# Patient Record
Sex: FEMALE | Race: BLACK OR AFRICAN AMERICAN | NOT HISPANIC OR LATINO | URBAN - METROPOLITAN AREA
[De-identification: names, ages, dates, MRNs, and addresses within clinical notes are randomized per-mention and may not be internally consistent; named-entity substitution may affect disease eponyms.]

---

## 2019-07-19 ENCOUNTER — INPATIENT (INPATIENT)
Facility: HOSPITAL | Age: 64
LOS: 3 days | Discharge: HOME | End: 2019-07-23
Attending: HOSPITALIST | Admitting: HOSPITALIST
Payer: COMMERCIAL

## 2019-07-19 VITALS
TEMPERATURE: 98 F | DIASTOLIC BLOOD PRESSURE: 74 MMHG | OXYGEN SATURATION: 100 % | SYSTOLIC BLOOD PRESSURE: 127 MMHG | HEART RATE: 60 BPM | RESPIRATION RATE: 18 BRPM

## 2019-07-19 DIAGNOSIS — R51 HEADACHE: ICD-10-CM

## 2019-07-19 LAB
ALBUMIN SERPL ELPH-MCNC: 4 G/DL — SIGNIFICANT CHANGE UP (ref 3.3–5)
ALP SERPL-CCNC: 56 U/L — SIGNIFICANT CHANGE UP (ref 40–120)
ALT FLD-CCNC: 28 U/L — SIGNIFICANT CHANGE UP (ref 4–33)
ANION GAP SERPL CALC-SCNC: 14 MMO/L — SIGNIFICANT CHANGE UP (ref 7–14)
APPEARANCE UR: CLEAR — SIGNIFICANT CHANGE UP
AST SERPL-CCNC: 21 U/L — SIGNIFICANT CHANGE UP (ref 4–32)
BASE EXCESS BLDV CALC-SCNC: -3.2 MMOL/L — SIGNIFICANT CHANGE UP
BASE EXCESS BLDV CALC-SCNC: 0.5 MMOL/L — SIGNIFICANT CHANGE UP
BASOPHILS # BLD AUTO: 0.04 K/UL — SIGNIFICANT CHANGE UP (ref 0–0.2)
BASOPHILS NFR BLD AUTO: 0.8 % — SIGNIFICANT CHANGE UP (ref 0–2)
BILIRUB SERPL-MCNC: 0.3 MG/DL — SIGNIFICANT CHANGE UP (ref 0.2–1.2)
BILIRUB UR-MCNC: NEGATIVE — SIGNIFICANT CHANGE UP
BLOOD GAS VENOUS - CREATININE: 0.72 MG/DL — SIGNIFICANT CHANGE UP (ref 0.5–1.3)
BLOOD GAS VENOUS - CREATININE: 0.83 MG/DL — SIGNIFICANT CHANGE UP (ref 0.5–1.3)
BLOOD UR QL VISUAL: NEGATIVE — SIGNIFICANT CHANGE UP
BUN SERPL-MCNC: 11 MG/DL — SIGNIFICANT CHANGE UP (ref 7–23)
CALCIUM SERPL-MCNC: 9.1 MG/DL — SIGNIFICANT CHANGE UP (ref 8.4–10.5)
CHLORIDE BLDV-SCNC: 109 MMOL/L — HIGH (ref 96–108)
CHLORIDE BLDV-SCNC: 112 MMOL/L — HIGH (ref 96–108)
CHLORIDE SERPL-SCNC: 106 MMOL/L — SIGNIFICANT CHANGE UP (ref 98–107)
CO2 SERPL-SCNC: 19 MMOL/L — LOW (ref 22–31)
COLOR SPEC: SIGNIFICANT CHANGE UP
CREAT SERPL-MCNC: 0.78 MG/DL — SIGNIFICANT CHANGE UP (ref 0.5–1.3)
EOSINOPHIL # BLD AUTO: 0 K/UL — SIGNIFICANT CHANGE UP (ref 0–0.5)
EOSINOPHIL NFR BLD AUTO: 0 % — SIGNIFICANT CHANGE UP (ref 0–6)
GAS PNL BLDV: 132 MMOL/L — LOW (ref 136–146)
GAS PNL BLDV: 139 MMOL/L — SIGNIFICANT CHANGE UP (ref 136–146)
GLUCOSE BLDV-MCNC: 151 MG/DL — HIGH (ref 70–99)
GLUCOSE BLDV-MCNC: 167 MG/DL — HIGH (ref 70–99)
GLUCOSE SERPL-MCNC: 156 MG/DL — HIGH (ref 70–99)
GLUCOSE UR-MCNC: 30 — SIGNIFICANT CHANGE UP
HCO3 BLDV-SCNC: 21 MMOL/L — SIGNIFICANT CHANGE UP (ref 20–27)
HCO3 BLDV-SCNC: 24 MMOL/L — SIGNIFICANT CHANGE UP (ref 20–27)
HCT VFR BLD CALC: 38.5 % — SIGNIFICANT CHANGE UP (ref 34.5–45)
HCT VFR BLDV CALC: 39.7 % — SIGNIFICANT CHANGE UP (ref 34.5–45)
HCT VFR BLDV CALC: 39.8 % — SIGNIFICANT CHANGE UP (ref 34.5–45)
HGB BLD-MCNC: 12.4 G/DL — SIGNIFICANT CHANGE UP (ref 11.5–15.5)
HGB BLDV-MCNC: 12.9 G/DL — SIGNIFICANT CHANGE UP (ref 11.5–15.5)
HGB BLDV-MCNC: 12.9 G/DL — SIGNIFICANT CHANGE UP (ref 11.5–15.5)
IMM GRANULOCYTES NFR BLD AUTO: 0.4 % — SIGNIFICANT CHANGE UP (ref 0–1.5)
KETONES UR-MCNC: SIGNIFICANT CHANGE UP
LACTATE BLDV-MCNC: 2.8 MMOL/L — HIGH (ref 0.5–2)
LACTATE BLDV-MCNC: 3.9 MMOL/L — HIGH (ref 0.5–2)
LEUKOCYTE ESTERASE UR-ACNC: NEGATIVE — SIGNIFICANT CHANGE UP
LYMPHOCYTES # BLD AUTO: 0.86 K/UL — LOW (ref 1–3.3)
LYMPHOCYTES # BLD AUTO: 18.3 % — SIGNIFICANT CHANGE UP (ref 13–44)
MCHC RBC-ENTMCNC: 30.1 PG — SIGNIFICANT CHANGE UP (ref 27–34)
MCHC RBC-ENTMCNC: 32.2 % — SIGNIFICANT CHANGE UP (ref 32–36)
MCV RBC AUTO: 93.4 FL — SIGNIFICANT CHANGE UP (ref 80–100)
MONOCYTES # BLD AUTO: 0.34 K/UL — SIGNIFICANT CHANGE UP (ref 0–0.9)
MONOCYTES NFR BLD AUTO: 7.2 % — SIGNIFICANT CHANGE UP (ref 2–14)
NEUTROPHILS # BLD AUTO: 3.45 K/UL — SIGNIFICANT CHANGE UP (ref 1.8–7.4)
NEUTROPHILS NFR BLD AUTO: 73.3 % — SIGNIFICANT CHANGE UP (ref 43–77)
NITRITE UR-MCNC: NEGATIVE — SIGNIFICANT CHANGE UP
NRBC # FLD: 0 K/UL — SIGNIFICANT CHANGE UP (ref 0–0)
PCO2 BLDV: 37 MMHG — LOW (ref 41–51)
PCO2 BLDV: 44 MMHG — SIGNIFICANT CHANGE UP (ref 41–51)
PH BLDV: 7.32 PH — SIGNIFICANT CHANGE UP (ref 7.32–7.43)
PH BLDV: 7.43 PH — SIGNIFICANT CHANGE UP (ref 7.32–7.43)
PH UR: 7.5 — SIGNIFICANT CHANGE UP (ref 5–8)
PLATELET # BLD AUTO: 235 K/UL — SIGNIFICANT CHANGE UP (ref 150–400)
PMV BLD: 9.6 FL — SIGNIFICANT CHANGE UP (ref 7–13)
PO2 BLDV: 35 MMHG — SIGNIFICANT CHANGE UP (ref 35–40)
PO2 BLDV: 50 MMHG — HIGH (ref 35–40)
POTASSIUM BLDV-SCNC: 3.5 MMOL/L — SIGNIFICANT CHANGE UP (ref 3.4–4.5)
POTASSIUM BLDV-SCNC: 5.2 MMOL/L — HIGH (ref 3.4–4.5)
POTASSIUM SERPL-MCNC: 3.8 MMOL/L — SIGNIFICANT CHANGE UP (ref 3.5–5.3)
POTASSIUM SERPL-SCNC: 3.8 MMOL/L — SIGNIFICANT CHANGE UP (ref 3.5–5.3)
PROT SERPL-MCNC: 7.2 G/DL — SIGNIFICANT CHANGE UP (ref 6–8.3)
PROT UR-MCNC: 10 — SIGNIFICANT CHANGE UP
RBC # BLD: 4.12 M/UL — SIGNIFICANT CHANGE UP (ref 3.8–5.2)
RBC # FLD: 12.5 % — SIGNIFICANT CHANGE UP (ref 10.3–14.5)
SAO2 % BLDV: 67 % — SIGNIFICANT CHANGE UP (ref 60–85)
SAO2 % BLDV: 79.9 % — SIGNIFICANT CHANGE UP (ref 60–85)
SODIUM SERPL-SCNC: 139 MMOL/L — SIGNIFICANT CHANGE UP (ref 135–145)
SP GR SPEC: 1.03 — SIGNIFICANT CHANGE UP (ref 1–1.04)
TROPONIN T, HIGH SENSITIVITY: < 6 NG/L — SIGNIFICANT CHANGE UP (ref ?–14)
UROBILINOGEN FLD QL: NORMAL — SIGNIFICANT CHANGE UP
WBC # BLD: 4.71 K/UL — SIGNIFICANT CHANGE UP (ref 3.8–10.5)
WBC # FLD AUTO: 4.71 K/UL — SIGNIFICANT CHANGE UP (ref 3.8–10.5)

## 2019-07-19 PROCEDURE — 70496 CT ANGIOGRAPHY HEAD: CPT | Mod: 26

## 2019-07-19 RX ORDER — ONDANSETRON 8 MG/1
4 TABLET, FILM COATED ORAL ONCE
Refills: 0 | Status: COMPLETED | OUTPATIENT
Start: 2019-07-19 | End: 2019-07-19

## 2019-07-19 RX ORDER — METOCLOPRAMIDE HCL 10 MG
10 TABLET ORAL ONCE
Refills: 0 | Status: COMPLETED | OUTPATIENT
Start: 2019-07-19 | End: 2019-07-19

## 2019-07-19 RX ORDER — ASPIRIN/CALCIUM CARB/MAGNESIUM 324 MG
81 TABLET ORAL ONCE
Refills: 0 | Status: COMPLETED | OUTPATIENT
Start: 2019-07-19 | End: 2019-07-19

## 2019-07-19 RX ORDER — SODIUM CHLORIDE 9 MG/ML
1000 INJECTION INTRAMUSCULAR; INTRAVENOUS; SUBCUTANEOUS ONCE
Refills: 0 | Status: COMPLETED | OUTPATIENT
Start: 2019-07-19 | End: 2019-07-19

## 2019-07-19 RX ORDER — MECLIZINE HCL 12.5 MG
25 TABLET ORAL ONCE
Refills: 0 | Status: COMPLETED | OUTPATIENT
Start: 2019-07-19 | End: 2019-07-19

## 2019-07-19 RX ORDER — ONDANSETRON 8 MG/1
4 TABLET, FILM COATED ORAL ONCE
Refills: 0 | Status: DISCONTINUED | OUTPATIENT
Start: 2019-07-19 | End: 2019-07-19

## 2019-07-19 RX ORDER — CLONAZEPAM 1 MG
0.5 TABLET ORAL ONCE
Refills: 0 | Status: DISCONTINUED | OUTPATIENT
Start: 2019-07-19 | End: 2019-07-19

## 2019-07-19 RX ADMIN — SODIUM CHLORIDE 1000 MILLILITER(S): 9 INJECTION INTRAMUSCULAR; INTRAVENOUS; SUBCUTANEOUS at 18:19

## 2019-07-19 RX ADMIN — Medication 0.5 MILLIGRAM(S): at 22:14

## 2019-07-19 RX ADMIN — ONDANSETRON 4 MILLIGRAM(S): 8 TABLET, FILM COATED ORAL at 21:13

## 2019-07-19 RX ADMIN — SODIUM CHLORIDE 1000 MILLILITER(S): 9 INJECTION INTRAMUSCULAR; INTRAVENOUS; SUBCUTANEOUS at 22:07

## 2019-07-19 RX ADMIN — Medication 81 MILLIGRAM(S): at 22:47

## 2019-07-19 RX ADMIN — Medication 25 MILLIGRAM(S): at 18:19

## 2019-07-19 RX ADMIN — SODIUM CHLORIDE 1000 MILLILITER(S): 9 INJECTION INTRAMUSCULAR; INTRAVENOUS; SUBCUTANEOUS at 22:06

## 2019-07-19 RX ADMIN — SODIUM CHLORIDE 1000 MILLILITER(S): 9 INJECTION INTRAMUSCULAR; INTRAVENOUS; SUBCUTANEOUS at 19:14

## 2019-07-19 RX ADMIN — Medication 10 MILLIGRAM(S): at 19:56

## 2019-07-19 NOTE — ED ADULT TRIAGE NOTE - CHIEF COMPLAINT QUOTE
Patient reports nausea/vomiting and dizziness since this AM. Received 4mg Zofran by EMS. Last BM this AM, reports WNL. PMH appendectomy. Patient reports nausea/vomiting and dizziness since this AM. Received 4mg Zofran by EMS. Last BM this AM, reports WNL. PMH appendectomy. EKG shows NSR.

## 2019-07-19 NOTE — ED PROVIDER NOTE - CLINICAL SUMMARY MEDICAL DECISION MAKING FREE TEXT BOX
dizziness, .nausea and vomiting, concern for posterior cva, cerebellar intact, out of time frame for TPa - labs, CT head, IV hydration, dizziness, .nausea and vomiting, concern for posterior cva, cerebellar intact, out of the window  for TPa - labs, CT head, IV hydration, will most likely admit as pt cannot ambulate;

## 2019-07-19 NOTE — CONSULT NOTE ADULT - ASSESSMENT
64 yo RH AA F with PMH of pre-DM presents to the ER with acute onset dizziness. She describes that the dizziness is a sensation of feeling off balance. She has had nausea and vomiting. Neuro exam reveals positive [place]-Hallpike to the left. She elected not to participate in gait assessment.    Impression: Multiple episodes of dizziness described as "off balance," nausea and vomiting, with unidirectional horizontal and torsional nystagmus, is most consistent with peripheral vertigo. Most likely she has BPPV.    Plan:  -klonopin 0.25 mg BID  -PT/OT    Management & disposition NOT discussed with neuro attending, Dr. Luis Armstrong 62 yo RH AA F with PMH of pre-DM presents to the ER with acute onset dizziness. She describes that the dizziness is a sensation of feeling off balance. She has had nausea and vomiting. Neuro exam reveals positive [place]-Hallpike to the left. She elected not to participate in gait assessment.    Impression: Multiple episodes of dizziness described as "off balance," nausea and vomiting, with unidirectional horizontal and torsional nystagmus, is most consistent with peripheral vertigo. Most likely she has BPPV.    Plan:  -xanax 0.25 mg PO daily PRN  -meclizine 25 mg TID  -PT/OT    Management & disposition discussed with neuro attending, Dr. Luis Armstrong

## 2019-07-19 NOTE — ED PROVIDER NOTE - OBJECTIVE STATEMENT
Pt is 62 y/o female with no significant Pmxh here for eval of dizziness. As per pt she experienced sensation of "feeling like I am going to fall" yesterday am upon awakening, also with nausea, vomited x 1, sx somehow subsided throughout the day, today am pt experienced same sx again,  vomited x 3 (NB/NB emesis), frontal HA, unable to walk secondary to dizziness. Denies cp, sob, palpitations, denies visual changes, head injury/falls, blood thinners use, denies speech slurring, facial droop, recent ENT infections, denies hx of vertigo. Unable to walk secondary to dizziness. pcp - Blake Claire.

## 2019-07-19 NOTE — ED PROVIDER NOTE - PROGRESS NOTE DETAILS
EJ Turner: Received signout from EJ Amador. Pt accepted to medicine service, requests ASA 81, neurology following.

## 2019-07-19 NOTE — ED PROVIDER NOTE - ATTENDING CONTRIBUTION TO CARE
Gong: I have seen and examined the patient face to face, have reviewed and addended the HPI, PE and a/p as necessary.     62 yo F with no pmh a/w dizziness.  Pt reports symptoms started upon waking yesterday with associated nausea and vomiting.  Pt reports having difficulty walking with symptoms and developed headache.  Pt reports headache is frontal in nature.  Patient reports nausea and vomiting this am when waking with severe dizziness.  Reports feeling unstaday when walking.  No fever/chills, No photophobia/eye pain/changes in vision, No ear pain/sore throat/dysphagia, No chest pain/palpitations, no SOB/cough/wheeze/stridor, No abdominal pain, no dysuria/frequency/discharge, No neck/back pain, no rash.    GEN - NAD; well appearing; A+O x3; non-toxic appearing  CARD -s1s2, RRR, no M,G,R;   PULM - CTA b/l, symmetric breath sounds;   ABD:  +BS, ND, NT, soft, no guarding, no rebound, no masses;   BACK: no CVA tenderness, Normal  spine;   EXT: symmetric pulses, 2+ dp, capillary refill < 2 seconds, no clubbing, no cyanosis, no edema  NEURO: alert, CN 2-12 intact, reflexes nl, sensation nl, coordination nl, finger to nose nl, romberg negative, motor 5/5 RUE/LUE/RLE/LLE/EHL/Plantar flexion, nopronator drift, unable to assess gait 2/2 unsteadiness.  no nystagmus on exam.    Pt is 62 y/o female a/w dizziness today concern for atypical migraine vs posterior cva vs BPPV.  Currently has no focal deficits on exam, no nystagmus, unsteady gait with just standing.  Will obtain labs, ekg, ct head, ct angio head and neck, neuro consult, symptomatic care and re-eval

## 2019-07-19 NOTE — ED ADULT NURSE NOTE - CHIEF COMPLAINT QUOTE
Patient reports nausea/vomiting and dizziness since this AM. Received 4mg Zofran by EMS. Last BM this AM, reports WNL. PMH appendectomy. EKG shows NSR.

## 2019-07-19 NOTE — CONSULT NOTE ADULT - SUBJECTIVE AND OBJECTIVE BOX
HPI: 62 yo RH AA F with PMH of pre-DM presents to the ER with acute onset dizziness. She describes that the dizziness is a sensation of feeling off balance. She states that she had one episode yesterday, which started when she was at work. She got up from a seated position and all of a sudden developed dizziness. Symptoms lasted from 12 pm to 6 pm, persistently without any period of resolution in between, and subsequently after 6 pm she was back to normal. Today, she developed symptoms at 7 am, when she was standing and turned her head. Symptoms persisted. Had multiple    (Stroke only)  NIHSS:   MRS:   ICH:     REVIEW OF SYSTEMS  General:	  Skin/Breast:	  Ophthalmologic:  ENMT:	  Respiratory and Thorax:	  Cardiovascular:	  Gastrointestinal:	  Genitourinary:	  Musculoskeletal:	  Neurological:	  Psychiatric:	  Hematology/Lymphatics:	  Endocrine:	  Allergic/Immunologic:	    A 10-system ROS was performed and is negative except for those items noted above and/or in the HPI.    PAST MEDICAL & SURGICAL HISTORY:  No pertinent past medical history    FAMILY HISTORY:    SOCIAL HISTORY:   T/E/D:   Occupation:   Lives with:     MEDICATIONS (HOME):  Home Medications:    MEDICATIONS  (STANDING):    MEDICATIONS  (PRN):    ALLERGIES/INTOLERANCES:  Allergies  No Known Allergies    Intolerances    VITALS & EXAMINATION:  Vital Signs Last 24 Hrs  T(C): 36.2 (19 Jul 2019 18:28), Max: 36.7 (19 Jul 2019 16:21)  T(F): 97.1 (19 Jul 2019 18:28), Max: 98 (19 Jul 2019 16:21)  HR: 62 (19 Jul 2019 18:28) (60 - 62)  BP: 161/86 (19 Jul 2019 18:28) (127/74 - 161/86)  BP(mean): --  RR: 18 (19 Jul 2019 18:28) (18 - 18)  SpO2: 97% (19 Jul 2019 18:28) (97% - 100%)    General:  Constitutional: Obese Female, appears stated age, in no apparent distress including pain  Head: Normocephalic & atraumatic.  ENT: Patent ear canals, intact TM, mucus membranes moist & pink, neck supple, no lymphadenopathy.   Respiratory: Patent airway. All lung fields are clear to auscultation bilaterally.  Extremities: No cyanosis, clubbing, or edema.  Skin: No rashes, bruising, or discoloration.    Cardiovascular (>2): RRR no murmurs. Carotid pulsations symmetric, no bruits. Normal capillary beds refill, 1-2 seconds or less.     Neurological (>12):  MS: Awake, alert, oriented to person, place, situation, time. Normal affect. Follows all commands.    Language: Speech is clear, fluent with good repetition & comprehension (able to name objects___)    CNs: PERRLA (R = 3mm, L = 3mm). VFF. EOMI no nystagmus, no diplopia. V1-3 intact to LT/pinprick, well developed masseter muscles b/l. No facial asymmetry b/l, full eye closure strength b/l. Hearing grossly normal (rubbing fingers) b/l. Symmetric palate elevation in midline. Gag reflex deferred. Head turning & shoulder shrug intact b/l. Tongue midline, normal movements, no atrophy.    Fundoscopic: pale w/ sharp discs margins No vascular changes.      Motor: Normal muscle bulk & tone. No noticeable tremor or seizure. No pronator drift.              Deltoid	Biceps	Triceps	Wrist	Finger ABd	   R	5	5	5	5	5		5 	  L	5	5	5	5	5		5    	H-Flex	H-Ext	H-ABd	H-ADd	K-Flex	K-Ext	D-Flex	P-Flex  R	5	5	5	5	5	5	5	5 	   L	5	5	5	5	5	5	5	5	     Sensation: Intact to LT/PP/Temp/Vibration/Position b/l throughout.     Cortical: Extinction on DSS (neglect): none    Reflexes:              Biceps(C5)       BR(C6)     Triceps(C7)               Patellar(L4)    Achilles(S1)    Plantar Resp  R	2	          2	             2		        2		    2		Down   L	2	          2	             2		        2		    2		Down     Coordination: intact rapid-alt movements. No dysmetria to FTN/HTS    Gait: Normal Romberg. No postural instability. Normal stance and tandem gait.     LABORATORY:  CBC                       12.4   4.71  )-----------( 235      ( 19 Jul 2019 18:17 )             38.5     Chem 07-19    139  |  106  |  11  ----------------------------<  156<H>  3.8   |  19<L>  |  0.78    Ca    9.1      19 Jul 2019 18:17    TPro  7.2  /  Alb  4.0  /  TBili  0.3  /  DBili  x   /  AST  21  /  ALT  28  /  AlkPhos  56  07-19    LFTs LIVER FUNCTIONS - ( 19 Jul 2019 18:17 )  Alb: 4.0 g/dL / Pro: 7.2 g/dL / ALK PHOS: 56 u/L / ALT: 28 u/L / AST: 21 u/L / GGT: x           Coagulopathy   Lipid Panel   A1c   Cardiac enzymes     U/A   CSF  Immunological  Other    STUDIES & IMAGING:  Studies (EKG, EEG, EMG, etc):     Radiology (XR, CT, MR, U/S, TTE/NATALYA): HPI: 62 yo RH AA F with PMH of pre-DM presents to the ER with acute onset dizziness. She describes that the dizziness is a sensation of feeling off balance. She states that she had one episode yesterday, which started when she was at work. She got up from a seated position and all of a sudden developed dizziness. Symptoms lasted from 12 pm to 6 pm, persistently without any period of resolution in between, and subsequently after 6 pm she was back to normal. Today, she developed symptoms at 7 am, when she was standing and turned her head. Symptoms persisted. Had multiple episodes of nausea and vomiting. Denies diplopia, dysarthria, numbness, weakness, tinnitus, or dysphagia. Symptoms worse with movement.    NIHSS: 0  MRS: 0    REVIEW OF SYSTEMS    A 10-system ROS was performed and is negative except for those items noted above and/or in the HPI.    PAST MEDICAL & SURGICAL HISTORY:  No pertinent past medical history    FAMILY HISTORY:    SOCIAL HISTORY:   T/E/D: none  Lives with:     MEDICATIONS (HOME):  Home Medications:    MEDICATIONS  (STANDING):    MEDICATIONS  (PRN):    ALLERGIES/INTOLERANCES:  Allergies  No Known Allergies    Intolerances    VITALS & EXAMINATION:  Vital Signs Last 24 Hrs  T(C): 36.2 (19 Jul 2019 18:28), Max: 36.7 (19 Jul 2019 16:21)  T(F): 97.1 (19 Jul 2019 18:28), Max: 98 (19 Jul 2019 16:21)  HR: 62 (19 Jul 2019 18:28) (60 - 62)  BP: 161/86 (19 Jul 2019 18:28) (127/74 - 161/86)  BP(mean): --  RR: 18 (19 Jul 2019 18:28) (18 - 18)  SpO2: 97% (19 Jul 2019 18:28) (97% - 100%)    General:  Constitutional: appears stated age, in no apparent distress including pain  Head: Normocephalic & atraumatic.  Extremities: No cyanosis, clubbing, or edema.  Skin: No rashes, bruising, or discoloration.    Neurological (>12):  MS: Awake, alert, oriented to person, place, situation, time. Normal affect. Follows all commands.  Language: Speech is clear, fluent    CNs: PERRL. EOMI, no diplopia. V1-3 intact to LT/pinprick, No facial asymmetry b/l. Tongue midline    Motor: Normal muscle bulk & tone. No noticeable tremor or seizure. No pronator drift.                Deltoid	Biceps	Triceps	   R	5	5	5	5 	  L	5	5	5	5    	H-Flex	K-Flex	K-Ext	D-Flex	P-Flex  R	5	5	5	5	5 	   L	5	5	5	5	5	     Sensation: Intact to LT     Reflexes:              Biceps(C5)       BR(C6)     Triceps(C7)               Patellar(L4)    Achilles(S1)    Plantar Resp  R	2	          2	             2		        2		    2		Down   L	2	          2	             2		        2		    2		Down     Coordination: No dysmetria to FTN    Gait: did not participate to gait testing    [place]-Hallpike: horizontal and torsional nystagmus towards the left with left ear down.    head thrust: no correction noted bilaterally    LABORATORY:  CBC                       12.4   4.71  )-----------( 235      ( 19 Jul 2019 18:17 )             38.5     Chem 07-19    139  |  106  |  11  ----------------------------<  156<H>  3.8   |  19<L>  |  0.78    Ca    9.1      19 Jul 2019 18:17    TPro  7.2  /  Alb  4.0  /  TBili  0.3  /  DBili  x   /  AST  21  /  ALT  28  /  AlkPhos  56  07-19    LFTs LIVER FUNCTIONS - ( 19 Jul 2019 18:17 )  Alb: 4.0 g/dL / Pro: 7.2 g/dL / ALK PHOS: 56 u/L / ALT: 28 u/L / AST: 21 u/L / GGT: x           Coagulopathy   Lipid Panel   A1c   Cardiac enzymes     U/A   CSF  Immunological  Other    STUDIES & IMAGING:  Studies (EKG, EEG, EMG, etc):     Radiology (XR, CT, MR, U/S, TTE/NATALYA):

## 2019-07-19 NOTE — CONSULT NOTE ADULT - ATTENDING COMMENTS
Patient seen and examined and above note reviewed and I agree with assessment and plan as outlined. Patient has been feeling dizzy since Thursday especially when turning her head and bending down or looking up. She also had nausea and vomiting and poor balance.  She is feeling better today and the vomiting and nausea improved and she was able to eat breakfast and walk to bathroom. Her exam shows nystagmus in horizontal gaze but no other focal neurologic deficits and CT head was negative for acute pathology. No further neuroimaging warranted as she is improving.   Likely BPPV and peripheral in nature.   Continue fluids, symptomatic management and PT follow up.  Will need outpatient vestibular therapy and all questions answered and continue supportive care.

## 2019-07-19 NOTE — ED ADULT NURSE NOTE - OBJECTIVE STATEMENT
Intake RN: Patient is a 64 y/o F a&ox4 p/w a c/c of dizziness, gait ataxia and nausea/vomiting that began yesterday.  Patient also endorsing HA.  On exam no slurred speech, pronator drift, limb ataxia, aphasia, facial droop noted.  Strength and sensation noted to be equal BL.  Patient denies F/C, abd pain, GI/ symptoms, CP, SOB.  Patient in nad, received with 20 gauge PIV in left ac. PA at bedside evaluating.

## 2019-07-20 ENCOUNTER — TRANSCRIPTION ENCOUNTER (OUTPATIENT)
Age: 64
End: 2019-07-20

## 2019-07-20 DIAGNOSIS — R51 HEADACHE: ICD-10-CM

## 2019-07-20 DIAGNOSIS — R42 DIZZINESS AND GIDDINESS: ICD-10-CM

## 2019-07-20 DIAGNOSIS — Z29.9 ENCOUNTER FOR PROPHYLACTIC MEASURES, UNSPECIFIED: ICD-10-CM

## 2019-07-20 DIAGNOSIS — R79.89 OTHER SPECIFIED ABNORMAL FINDINGS OF BLOOD CHEMISTRY: ICD-10-CM

## 2019-07-20 DIAGNOSIS — Z90.49 ACQUIRED ABSENCE OF OTHER SPECIFIED PARTS OF DIGESTIVE TRACT: Chronic | ICD-10-CM

## 2019-07-20 LAB
ANION GAP SERPL CALC-SCNC: 14 MMO/L — SIGNIFICANT CHANGE UP (ref 7–14)
BUN SERPL-MCNC: 8 MG/DL — SIGNIFICANT CHANGE UP (ref 7–23)
CALCIUM SERPL-MCNC: 8.5 MG/DL — SIGNIFICANT CHANGE UP (ref 8.4–10.5)
CHLORIDE SERPL-SCNC: 107 MMOL/L — SIGNIFICANT CHANGE UP (ref 98–107)
CHOLEST SERPL-MCNC: 167 MG/DL — SIGNIFICANT CHANGE UP (ref 120–199)
CK MB BLD-MCNC: 1.74 NG/ML — SIGNIFICANT CHANGE UP (ref 1–4.7)
CK SERPL-CCNC: 159 U/L — SIGNIFICANT CHANGE UP (ref 25–170)
CO2 SERPL-SCNC: 18 MMOL/L — LOW (ref 22–31)
CREAT SERPL-MCNC: 0.67 MG/DL — SIGNIFICANT CHANGE UP (ref 0.5–1.3)
GLUCOSE SERPL-MCNC: 145 MG/DL — HIGH (ref 70–99)
HBA1C BLD-MCNC: 6.3 % — HIGH (ref 4–5.6)
HCT VFR BLD CALC: 35.9 % — SIGNIFICANT CHANGE UP (ref 34.5–45)
HDLC SERPL-MCNC: 63 MG/DL — SIGNIFICANT CHANGE UP (ref 45–65)
HGB BLD-MCNC: 11.6 G/DL — SIGNIFICANT CHANGE UP (ref 11.5–15.5)
HIV 1+2 AB+HIV1 P24 AG SERPL QL IA: SIGNIFICANT CHANGE UP
LACTATE SERPL-SCNC: 1 MMOL/L — SIGNIFICANT CHANGE UP (ref 0.5–2)
LACTATE SERPL-SCNC: 1.3 MMOL/L — SIGNIFICANT CHANGE UP (ref 0.5–2)
LIPID PNL WITH DIRECT LDL SERPL: 101 MG/DL — SIGNIFICANT CHANGE UP
MAGNESIUM SERPL-MCNC: 1.9 MG/DL — SIGNIFICANT CHANGE UP (ref 1.6–2.6)
MCHC RBC-ENTMCNC: 30.2 PG — SIGNIFICANT CHANGE UP (ref 27–34)
MCHC RBC-ENTMCNC: 32.3 % — SIGNIFICANT CHANGE UP (ref 32–36)
MCV RBC AUTO: 93.5 FL — SIGNIFICANT CHANGE UP (ref 80–100)
NRBC # FLD: 0 K/UL — SIGNIFICANT CHANGE UP (ref 0–0)
PHOSPHATE SERPL-MCNC: 2.4 MG/DL — LOW (ref 2.5–4.5)
PLATELET # BLD AUTO: 202 K/UL — SIGNIFICANT CHANGE UP (ref 150–400)
PMV BLD: 9.7 FL — SIGNIFICANT CHANGE UP (ref 7–13)
POTASSIUM SERPL-MCNC: 3.7 MMOL/L — SIGNIFICANT CHANGE UP (ref 3.5–5.3)
POTASSIUM SERPL-SCNC: 3.7 MMOL/L — SIGNIFICANT CHANGE UP (ref 3.5–5.3)
RBC # BLD: 3.84 M/UL — SIGNIFICANT CHANGE UP (ref 3.8–5.2)
RBC # FLD: 12.7 % — SIGNIFICANT CHANGE UP (ref 10.3–14.5)
SODIUM SERPL-SCNC: 139 MMOL/L — SIGNIFICANT CHANGE UP (ref 135–145)
TRIGL SERPL-MCNC: 54 MG/DL — SIGNIFICANT CHANGE UP (ref 10–149)
TROPONIN T, HIGH SENSITIVITY: < 6 NG/L — SIGNIFICANT CHANGE UP (ref ?–14)
TSH SERPL-MCNC: 0.3 UIU/ML — SIGNIFICANT CHANGE UP (ref 0.27–4.2)
WBC # BLD: 5.44 K/UL — SIGNIFICANT CHANGE UP (ref 3.8–10.5)
WBC # FLD AUTO: 5.44 K/UL — SIGNIFICANT CHANGE UP (ref 3.8–10.5)

## 2019-07-20 PROCEDURE — 99223 1ST HOSP IP/OBS HIGH 75: CPT

## 2019-07-20 PROCEDURE — 12345: CPT | Mod: NC

## 2019-07-20 PROCEDURE — 99222 1ST HOSP IP/OBS MODERATE 55: CPT | Mod: GC

## 2019-07-20 RX ORDER — FOLIC ACID 0.8 MG
1 TABLET ORAL DAILY
Refills: 0 | Status: DISCONTINUED | OUTPATIENT
Start: 2019-07-20 | End: 2019-07-23

## 2019-07-20 RX ORDER — HEPARIN SODIUM 5000 [USP'U]/ML
5000 INJECTION INTRAVENOUS; SUBCUTANEOUS EVERY 12 HOURS
Refills: 0 | Status: DISCONTINUED | OUTPATIENT
Start: 2019-07-20 | End: 2019-07-23

## 2019-07-20 RX ORDER — SODIUM CHLORIDE 9 MG/ML
3 INJECTION INTRAMUSCULAR; INTRAVENOUS; SUBCUTANEOUS EVERY 8 HOURS
Refills: 0 | Status: DISCONTINUED | OUTPATIENT
Start: 2019-07-20 | End: 2019-07-23

## 2019-07-20 RX ORDER — SODIUM,POTASSIUM PHOSPHATES 278-250MG
1 POWDER IN PACKET (EA) ORAL ONCE
Refills: 0 | Status: COMPLETED | OUTPATIENT
Start: 2019-07-20 | End: 2019-07-20

## 2019-07-20 RX ORDER — MECLIZINE HCL 12.5 MG
25 TABLET ORAL THREE TIMES A DAY
Refills: 0 | Status: DISCONTINUED | OUTPATIENT
Start: 2019-07-20 | End: 2019-07-21

## 2019-07-20 RX ORDER — ALPRAZOLAM 0.25 MG
0.25 TABLET ORAL DAILY
Refills: 0 | Status: DISCONTINUED | OUTPATIENT
Start: 2019-07-20 | End: 2019-07-23

## 2019-07-20 RX ORDER — ONDANSETRON 8 MG/1
4 TABLET, FILM COATED ORAL EVERY 6 HOURS
Refills: 0 | Status: DISCONTINUED | OUTPATIENT
Start: 2019-07-20 | End: 2019-07-23

## 2019-07-20 RX ORDER — ACETAMINOPHEN 500 MG
650 TABLET ORAL EVERY 6 HOURS
Refills: 0 | Status: DISCONTINUED | OUTPATIENT
Start: 2019-07-20 | End: 2019-07-23

## 2019-07-20 RX ORDER — FOLIC ACID 0.8 MG
1 TABLET ORAL
Qty: 0 | Refills: 0 | DISCHARGE

## 2019-07-20 RX ORDER — MECLIZINE HCL 12.5 MG
1 TABLET ORAL
Qty: 90 | Refills: 0
Start: 2019-07-20 | End: 2019-08-18

## 2019-07-20 RX ADMIN — Medication 25 MILLIGRAM(S): at 15:18

## 2019-07-20 RX ADMIN — HEPARIN SODIUM 5000 UNIT(S): 5000 INJECTION INTRAVENOUS; SUBCUTANEOUS at 05:47

## 2019-07-20 RX ADMIN — SODIUM CHLORIDE 3 MILLILITER(S): 9 INJECTION INTRAMUSCULAR; INTRAVENOUS; SUBCUTANEOUS at 05:48

## 2019-07-20 RX ADMIN — Medication 650 MILLIGRAM(S): at 05:47

## 2019-07-20 RX ADMIN — ONDANSETRON 4 MILLIGRAM(S): 8 TABLET, FILM COATED ORAL at 16:15

## 2019-07-20 RX ADMIN — Medication 25 MILLIGRAM(S): at 05:47

## 2019-07-20 RX ADMIN — Medication 1 PACKET(S): at 12:16

## 2019-07-20 RX ADMIN — SODIUM CHLORIDE 3 MILLILITER(S): 9 INJECTION INTRAMUSCULAR; INTRAVENOUS; SUBCUTANEOUS at 14:15

## 2019-07-20 RX ADMIN — Medication 25 MILLIGRAM(S): at 21:52

## 2019-07-20 RX ADMIN — Medication 650 MILLIGRAM(S): at 06:39

## 2019-07-20 RX ADMIN — SODIUM CHLORIDE 3 MILLILITER(S): 9 INJECTION INTRAMUSCULAR; INTRAVENOUS; SUBCUTANEOUS at 21:51

## 2019-07-20 RX ADMIN — ONDANSETRON 4 MILLIGRAM(S): 8 TABLET, FILM COATED ORAL at 05:47

## 2019-07-20 RX ADMIN — Medication 1 MILLIGRAM(S): at 12:16

## 2019-07-20 RX ADMIN — HEPARIN SODIUM 5000 UNIT(S): 5000 INJECTION INTRAVENOUS; SUBCUTANEOUS at 17:46

## 2019-07-20 NOTE — PHYSICAL THERAPY INITIAL EVALUATION ADULT - PERTINENT HX OF CURRENT PROBLEM, REHAB EVAL
Pt. is a 63 year old female admitted to [facility] secondary to a headache. No pertinent past medical history. (+) horizontal nystagmus with head turned to L side, (-) CT of head. No acute intracranial hemorrhage, mass effect, midline shift, extra axial

## 2019-07-20 NOTE — H&P ADULT - GASTROINTESTINAL DETAILS
normal/soft/no distention/bowel sounds normal soft/nontender/no guarding/bowel sounds normal/no rigidity/no distention

## 2019-07-20 NOTE — H&P ADULT - HISTORY OF PRESENT ILLNESS
64 yo F with no significant PMHx presenting with dizziness and 10/10 frontal headache associated with nausea and vomiting x 1 day. Patient states she woke up yesterday morning feeling like she was going to fall and has been unable to walk 2/2 dizziness. Patient c/o nausea and had 6 episodes of NB/NB emesis today. Denies syncope, LOC, head trauma. Patient reports she had experienced dizziness years ago but this episode is worse. Denies weakness, fever, chills, chest pain, palpitations, abdominal pain, melena, hematochezia, hematuria, dysuria, paresthesias, calf pain.     In ED, Vitals: Temp 97.4, HR: 72, Bp: 157/72, SpO2: 100% RA. Trop <6, Lactate 2.8 --> 3.9. UA neg. Patient is s/p meclizine 25mg x 2 Reglan, and Zofran 4mg x 2. Currently denies any sx. 62 yo F with no significant PMHx presenting with dizziness and 10/10 frontal headache associated with nausea and vomiting x 1 day. Patient states she woke up yesterday morning feeling like she was going to fall and has been unable to walk 2/2 dizziness. Patient c/o nausea and had 6 episodes of NB/NB emesis today (states that her emesis was clear in color). Denies syncope, LOC, head trauma. Patient reports she had experienced dizziness years ago but this episode is worse. Denies weakness, fever, chills, chest pain, palpitations, abdominal pain, melena, hematochezia, hematuria, dysuria, paresthesias, calf pain.     In ED, Vitals: Temp 98, HR: 60, Bp: 127/74, SpO2: 100% RA, RR: 18. Trop <6, Lactate 2.8 --> 3.9 (despite receiving 2L IVF). UA neg. Patient is s/p meclizine 25mg x 2 Reglan, and Zofran 4mg x 2. Currently denies any sx.

## 2019-07-20 NOTE — PHYSICAL THERAPY INITIAL EVALUATION ADULT - DIAGNOSIS, PT EVAL
Decreased balance, decreased postural control, decreased strength, dizziness, all limiting functional mobility.

## 2019-07-20 NOTE — DISCHARGE NOTE PROVIDER - CARE PROVIDER_API CALL
Luis Armstrong (DO)  Neurology  [address] [facility], [address]  [place] [address]  Phone: [phone]  Fax: [phone]  Follow Up Time:

## 2019-07-20 NOTE — H&P ADULT - NEGATIVE GASTROINTESTINAL SYMPTOMS
no hematochezia/no melena/no abdominal pain no diarrhea/no melena/no abdominal pain/no hematochezia/no constipation

## 2019-07-20 NOTE — H&P ADULT - RS GEN PE MLT RESP DETAILS PC
clear to auscultation bilaterally/no rales/breath sounds equal/respirations non-labored/no rhonchi no rhonchi/no wheezes/respirations non-labored/no rales/breath sounds equal/clear to auscultation bilaterally

## 2019-07-20 NOTE — PROGRESS NOTE ADULT - PROBLEM SELECTOR PLAN 1
- Given her symptom onset and examination findings, she likely has BPPV  - Seen by neurology who also agrees patient likely has BPPV  - Will c/w symptomatic management for now with meclizine 25mg TID, prn xanax  -monitor on tele  -negative finger to nose dysmetria, argues against central vertigo  - Low suspicion for CVA especially since her symptoms have resolved and her examination shows unidirectional horizontal nystagmus  - CT angio head- negative  - ruled out for MI with negative troponins x2 Trop <6   - Appreciate neuro recs - Given her symptom onset and examination findings, she likely has BPPV  - Seen by neurology who also agrees patient likely has BPPV  - Will c/w symptomatic management for now with meclizine 25mg TID, prn xanax  -monitor on tele  -negative finger to nose dysmetria, argues against central vertigo  - Low suspicion for CVA especially since her symptoms have resolved and her examination shows unidirectional horizontal nystagmus  - CT angio head- negative  - ruled out for MI with negative troponins x2 Trop <6   - Appreciate neuro recs  -seen by PT, suggest home with outpt vestibular services

## 2019-07-20 NOTE — H&P ADULT - NEGATIVE ENMT SYMPTOMS
no ear pain/no nasal congestion/no sinus symptoms no tinnitus/no throat pain/no ear pain/no nasal congestion/no nasal discharge/no sinus symptoms/no dry mouth

## 2019-07-20 NOTE — PHYSICAL THERAPY INITIAL EVALUATION ADULT - LEVEL OF INDEPENDENCE: GAIT, REHAB EVAL
Not assessed secondary to pt. reports of dizziness and desire to return to bed. Will assess when appropriate.

## 2019-07-20 NOTE — H&P ADULT - PROBLEM SELECTOR PLAN 2
CT angio head/neck negative   pain mgt CT angio head negative   pain mgt Lactate 2.8 -->3.9   s/p 3L IV NS   repeat lactate in AM   patient currently denies any pain   if rising lactate will consider further imaging - Patient with elevated lactate of 2.8 on initial lab work, received 2L NS but her repeat lactate increased to 3.9, received another 1L NS after  - Currently her BPs are well controlled, no tachycardia, not on any medications that would cause lactate elevations, no known history of HIV, malignancy, or mitochondrial dysfunction  - repeat lactate in AM   - mesenteric ischemia is on the differential as a cause of her elevated lactate but she denies any abd pain or diarrhea/hematochezia/BRBPR, if her lactate continues to rise then would consider further imaging, currently her abd examination is benign

## 2019-07-20 NOTE — DISCHARGE NOTE PROVIDER - NSDCCPCAREPLAN_GEN_ALL_CORE_FT
PRINCIPAL DISCHARGE DIAGNOSIS  Diagnosis: Dizziness  Assessment and Plan of Treatment: Likely due to Benign Paroxysmal Positional Vertigo. Continue Meclizine as prescribed. Script given for outpatient Vestibular Rehab services. Follow up with Neurology routinely at [address] [place] [address], [address], [place] [address]; call [phone] for appointment.      SECONDARY DISCHARGE DIAGNOSES  Diagnosis: Lactate blood increase  Assessment and Plan of Treatment: Improved after IV fluids. Follow up with your primary care physician within 1-2 weeks; call for appointment.

## 2019-07-20 NOTE — H&P ADULT - NSHPLABSRESULTS_GEN_ALL_CORE
12.4   4.71  )-----------( 235      ( 2019 18:17 )             38.5   07-    139  |  106  |  11  ----------------------------<  156<H>  3.8   |  19<L>  |  0.78    Ca    9.1      2019 18:17    TPro  7.2  /  Alb  4.0  /  TBili  0.3  /  DBili  x   /  AST  21  /  ALT  28  /  AlkPhos  56  07-    Trop <6     Urinalysis Basic - ( 2019 21:35 )    Color: LIGHT YELLOW / Appearance: CLEAR / S.029 / pH: 7.5  Gluc: 30 / Ketone: TRACE  / Bili: NEGATIVE / Urobili: NORMAL   Blood: NEGATIVE / Protein: 10 / Nitrite: NEGATIVE   Leuk Esterase: NEGATIVE / RBC: x / WBC x   Sq Epi: x / Non Sq Epi: x / Bacteria: x    EKG: NSR @ 70bpm TWI lead III [id number]     CT Angio Head w/ IV Cont     INTERPRETATION:  CT ANGIOGRAM OF THE HEAD DATED 2019.    CLINICAL INFORMATION:  Headache and dizziness.    TECHNIQUE:  Initially, a noncontrast CT of the brain is performed with   axial images from the cranial vertex to the skull base.  Thereafter, a   bolus of IV contrast is administered to acquire a CT angiogram of the   vertebral and carotid arterial vasculature from angle of the jaw to the   skull vertex.  90cc of Omnipaque 350 was administered without event.    10cc was discarded. At the conclusion of the CTA portion of the   examination a postcontrast CT of the brain was performed. MIP reformats   were obtained.    FINDINGS:    CT BRAIN:    No acute intracranial hemorrhage, mass effect, midline shift, extra axial   collection, hydrocephalus, or evidence of acute vascular territorial   infarction. No abnormal intracranial enhancement.    Visualized paranasal sinuses and mastoid air cells are clear.   Intraorbital contents are unremarkable. Visualized osseous structures are   intact.    CT ANGIOGRAM:    Examination is limited secondary to motion and despite suboptimal   contrast opacification.    Bilateral distal cervical internal, petrous, cavernous, and supraclinoid   segments of the internal carotid arteries are unremarkable. Bilateral   anterior and middle cerebral arteries are patent without flow-limiting   stenosis or proximal occlusion. Symmetric distal branching patterns. An   anterior commuting artery is identified.    Patent vertebrobasilar system with symmetric size of the bilateral   intracranial vertebral arteries. Proximal portions of the bilateral   posterior inferior and superior cerebellar arteries are identified.   Patent proximal bilateral posterior cerebral arteries. Bilateral   posterior communicating arteries are visualized.    No evidence of large vessel occlusion or aneurysm on CTA.    Hypoplastic left transverse and sigmoid sinuses as well as the left   internal jugular vein. Dural venous sinuses are otherwise grossly patent.    IMPRESSION:    CT BRAIN: No acute intracranial hemorrhage, mass effect, or evidence of   acute vascular territorial infarction.    If clinical symptoms persist or worsen, more sensitive evaluation with   brain MRI may be obtained, if no contraindications exist.    CTA HEAD: Mildly limited examination. No proximal large vessel occlusion. LABS and ADDITIONAL STUDIES:                12.4   4.71  )-----------( 235      ( 2019 18:17 )              38.5       139  |  106  |  11  ----------------------------<  156<H>  3.8   |  19<L>  |  0.78    Ca    9.1      2019 18:17    TPro  7.2  /  Alb  4.0  /  TBili  0.3  /  DBili  x   /  AST  21  /  ALT  28  /  AlkPhos  56      Trop <6     Urinalysis Basic - ( 2019 21:35 )  Color: LIGHT YELLOW / Appearance: CLEAR / S.029 / pH: 7.5  Gluc: 30 / Ketone: TRACE  / Bili: NEGATIVE / Urobili: NORMAL   Blood: NEGATIVE / Protein: 10 / Nitrite: NEGATIVE   Leuk Esterase: NEGATIVE / RBC: x / WBC x   Sq Epi: x / Non Sq Epi: x / Bacteria: x    Blood Gas Venous Comprehensive (19 @ 18:17)    Blood Gas Venous - Lactate: 2.8: Please note updated reference range. mmol/L  Blood Gas Venous Comprehensive (19 @ 21:00)    Blood Gas Venous - Lactate: 3.9: Please note updated reference range. mmol/L      EKG: NSR @ 70bpm TWI lead III [id number]         CT Angio Head w/ IV Cont     INTERPRETATION:  CT ANGIOGRAM OF THE HEAD DATED 2019.    CLINICAL INFORMATION:  Headache and dizziness.    FINDINGS:    CT BRAIN:    No acute intracranial hemorrhage, mass effect, midline shift, extra axial   collection, hydrocephalus, or evidence of acute vascular territorial   infarction. No abnormal intracranial enhancement.    Visualized paranasal sinuses and mastoid air cells are clear.   Intraorbital contents are unremarkable. Visualized osseous structures are   intact.    CT ANGIOGRAM:    Examination is limited secondary to motion and despite suboptimal   contrast opacification.    Bilateral distal cervical internal, petrous, cavernous, and supraclinoid   segments of the internal carotid arteries are unremarkable. Bilateral   anterior and middle cerebral arteries are patent without flow-limiting   stenosis or proximal occlusion. Symmetric distal branching patterns. An   anterior commuting artery is identified.    Patent vertebrobasilar system with symmetric size of the bilateral   intracranial vertebral arteries. Proximal portions of the bilateral   posterior inferior and superior cerebellar arteries are identified.   Patent proximal bilateral posterior cerebral arteries. Bilateral   posterior communicating arteries are visualized.    No evidence of large vessel occlusion or aneurysm on CTA.    Hypoplastic left transverse and sigmoid sinuses as well as the left   internal jugular vein. Dural venous sinuses are otherwise grossly patent.    IMPRESSION:    CT BRAIN: No acute intracranial hemorrhage, mass effect, or evidence of   acute vascular territorial infarction.    If clinical symptoms persist or worsen, more sensitive evaluation with   brain MRI may be obtained, if no contraindications exist.    CTA HEAD: Mildly limited examination. No proximal large vessel occlusion.

## 2019-07-20 NOTE — OCCUPATIONAL THERAPY INITIAL EVALUATION ADULT - PERTINENT HX OF CURRENT PROBLEM, REHAB EVAL
64 yo F with no significant PMHx presenting with dizziness and 10/10 frontal headache associated with nausea and vomiting x 1 day concerning for BPPV.

## 2019-07-20 NOTE — OCCUPATIONAL THERAPY INITIAL EVALUATION ADULT - PLANNED THERAPY INTERVENTIONS, OT EVAL
bed mobility training/strengthening/balance training/ADL retraining/motor coordination training/transfer training

## 2019-07-20 NOTE — H&P ADULT - NEGATIVE OPHTHALMOLOGIC SYMPTOMS
no diplopia/no blurred vision R/no blurred vision L no lacrimation L/no lacrimation R/no diplopia/no discharge R/no pain L/no scleral injection R/no blurred vision L/no blurred vision R/no discharge L/no pain R/no scleral injection L

## 2019-07-20 NOTE — DISCHARGE NOTE PROVIDER - NSDCFUADDINST_GEN_ALL_CORE_FT
follow up with your PMD in one week - call for appointment  follow up with Neurology in one week - call for appointment    vestibular rehab as instructed

## 2019-07-20 NOTE — PATIENT PROFILE ADULT - NSPROPTRIGHTSUPPORTPERSON_GEN_A_NUR
Called patient and informed her that INR is therapeutic. Advised patient to continue Coumadin 6 mg MWF and 4.5 mg the rest of the week, recheck 1 month. ACL order placed. Patient denies any bruising, bleeding, black or bloody stool, changes in diet.  Completed last dose of antibiotic yesterday.   Patient also states her cellulitis is continuing to improve, swelling is back to her \"normal\", drainage is very minimal and redness has changed to light pink. Patient states home nurse will be going into her home tomorrow for assessment and change bilateral lower leg dressings.   Advised patient to call with any changes, questions or concerns.  Patient in agreement and verbalized understanding.     Anticoagulation tracker updated.   declines

## 2019-07-20 NOTE — DISCHARGE NOTE PROVIDER - HOSPITAL COURSE
64 y/o F with no significant PMHx presented with dizziness and 10/10 frontal headache associated with nausea and vomiting x 1 day concerning for BPPV, also found to have lactic acidosis .        1. Dizziness.      - Given her symptom onset and examination findings, she likely has BPPV    - Seen by neurology who also agrees patient likely has BPPV    - Will c/w symptomatic management for now with meclizine 25mg TID, prn xanax    - negative finger to nose dysmetria, argues against central vertigo    - Low suspicion for CVA especially since her symptoms have resolved and her examination shows unidirectional horizontal nystagmus    - CT angio head- negative    - ruled out for MI with negative troponins x2 Trop <6     - seen by PT, suggest home with outpatient vestibular services.        2. Lactate blood increase.      - Patient with elevated lactate of 2.8 on initial lab work, received 2L NS but her repeat lactate increased to 3.9, received another 1L NS after    - Currently her BPs are well controlled, no tachycardia, not on any medications that would cause lactate elevations, no known history of HIV, malignancy, or mitochondrial dysfunction    -benign abdominal exam, no abdominal pain or clear evidence of bowel ischemia/infarct    - Repeat lactate 1.3 (wnl)            3. Headache.      - Likely 2/2 vertigo    - CTH without any acute findings    - pain management with tylenol prn.         Case discussed with Dr. Adams and patient is medically stable for discharge home. 62 y/o F with no significant PMHx presented with dizziness and 10/10 frontal headache associated with nausea and vomiting x 1 day concerning for BPPV, also found to have lactic acidosis .        1. Dizziness.      - Given her symptom onset and examination findings, she likely has BPPV    - Seen by neurology who also agrees patient likely has BPPV    - Will c/w symptomatic management for now with meclizine     - Meclizine decreased from 25mg tid to 12.5mg tid as patient felt it was too strong for her    - negative finger to nose dysmetria, argues against central vertigo    - Low suspicion for CVA especially since her symptoms have resolved and her examination shows unidirectional horizontal nystagmus    - CT angio head- negative    - ruled out for MI with negative troponins x2 Trop <6     - seen by PT, suggest home with outpatient vestibular services (script given).        2. Lactate blood increase.      - Patient with elevated lactate of 2.8 on initial lab work, received 2L NS but her repeat lactate increased to 3.9, received another 1L NS after    - Currently her BPs are well controlled, no tachycardia, not on any medications that would cause lactate elevations, no known history of HIV, malignancy, or mitochondrial dysfunction    -benign abdominal exam, no abdominal pain or clear evidence of bowel ischemia/infarct    - Repeat lactate 1.3 (wnl)            3. Headache.      - Likely 2/2 vertigo    - CTH without any acute findings    - pain management with tylenol prn.         Case discussed with Dr. Adams and patient is medically stable for discharge home. 64 y/o F with no significant PMHx presented with dizziness and 10/10 frontal headache associated with nausea and vomiting x 1 day concerning for BPPV, also found to have lactic acidosis .        1. Dizziness.      - Given her symptom onset and examination findings, she likely has BPPV    - Seen by neurology who also agrees patient likely has BPPV    - Will c/w symptomatic management for now with meclizine     - Meclizine decreased from 25mg tid to 12.5mg tid as patient felt it was too strong for her    - negative finger to nose dysmetria, argues against central vertigo    - Low suspicion for CVA especially since her symptoms have resolved and her examination shows unidirectional horizontal nystagmus    - CT angio head- negative    - ruled out for MI with negative troponins x2 Trop <6     - seen by PT, suggest home with outpatient vestibular services (script given).        2. Lactate blood increase.      - Patient with elevated lactate of 2.8 on initial lab work, received 2L NS but her repeat lactate increased to 3.9, received another 1L NS after    - Currently her BPs are well controlled, no tachycardia, not on any medications that would cause lactate elevations, no known history of HIV, malignancy, or mitochondrial dysfunction    -benign abdominal exam, no abdominal pain or clear evidence of bowel ischemia/infarct    - Repeat lactate 1.3 (wnl)            3. Headache.      - Likely 2/2 vertigo    - CTH without any acute findings    - pain management with tylenol prn.         Case discussed with Dr. Barker and patient is medically stable for discharge home, discharge medications reviewed with MD. 62 y/o F with no significant PMHx presented with dizziness and 10/10 frontal headache associated with nausea and vomiting x 1 day concerning for BPPV, also found to have lactic acidosis .        1. Dizziness.      - Given her symptom onset and examination findings, she likely has BPPV    - Seen by neurology who also agrees patient likely has BPPV    - Will c/w symptomatic management for now with meclizine     - Meclizine decreased from 25mg tid to 12.5mg tid as patient felt it was too strong for her    - negative finger to nose dysmetria, argues against central vertigo    - Low suspicion for CVA especially since her symptoms have resolved and her examination shows unidirectional horizontal nystagmus    - CT angio head- negative    - ruled out for MI with negative troponins x2 Trop <6     - seen by PT, suggest home with outpatient vestibular services (script given).        2. Lactate blood increase.      - Patient with elevated lactate of 2.8 on initial lab work, received 2L NS but her repeat lactate increased to 3.9, received another 1L NS after    - Currently her BPs are well controlled, no tachycardia, not on any medications that would cause lactate elevations, no known history of HIV, malignancy, or mitochondrial dysfunction    -benign abdominal exam, no abdominal pain or clear evidence of bowel ischemia/infarct    - Repeat lactate 1.3 (wnl)            3. Headache.      - Likely 2/2 vertigo    - CTH without any acute findings    - pain management with tylenol prn.     4. Sinus bradycardia- pt with no symptoms f/u as outpt, discussed with pmd.        Case discussed with Dr. Barker and patient is medically stable for discharge home, discharge medications reviewed with MD.

## 2019-07-20 NOTE — H&P ADULT - NEGATIVE CARDIOVASCULAR SYMPTOMS
no palpitations/no dyspnea on exertion/no chest pain/no orthopnea no palpitations/no chest pain/no dyspnea on exertion/no orthopnea/no peripheral edema

## 2019-07-20 NOTE — H&P ADULT - PROBLEM SELECTOR PLAN 1
admit to tele  r/o CVA  CT angio head/neck- negative  Trop <6, CE #2 ordered   Appreciate neuro recs   Found to have + [place]-Hallpike: horizontal and torsional nystagmus   Most likely BPPV  Xanax 0.25 mg PO QD PRN   Meclizine 25mg TID   Lactate 2.8 -->3.9   s/p 3L IV NS   repeat lactate in AM   PT/OT   F/U MD note admit to tele  r/o CVA  CT angio head- negative  Trop <6, CE #2 ordered   Appreciate neuro recs   Found to have + [place]pike: horizontal and torsional nystagmus   Most likely BPPV  Xanax 0.25 mg PO QD PRN   Meclizine 25mg TID   Lactate 2.8 -->3.9   s/p 3L IV NS   repeat lactate in AM   PT/OT   F/U MD note admit to tele  r/o CVA  CT angio head- negative  Trop <6, CE #2 ordered   Appreciate neuro recs   Found to have + Que-Hallpike: horizontal and torsional nystagmus   Most likely BPPV  Xanax 0.25 mg PO QD PRN   Meclizine 25mg TID   PT/OT   F/U MD note  Discussed with Dr. Beltran - Given her symptom onset and examination findings, she likely has BPPV  - Seen by neurology who also agrees patient likely has BPPV  - Will c/w symptomatic management for now with meclizine 25mg TID, prn ativan, prn zofran  - Will monitor on telemetry  - Low suspicion for CVA especially since her symptoms have resolved and her examination shows unidirectional horizontal nystagmus  - CT angio head- negative  - Trop <6, CE #2 ordered   - Appreciate neuro recs

## 2019-07-20 NOTE — H&P ADULT - NOSE
Group Therapy Note    Date: June 7    Group Start Time: 2181  Group End Time: 1115  Group Topic: Psychoeducation    [facility]        Group Therapy Note    Attendees: 11    Notes: Willing to listen during discussion on importance of wellness. Poor focus and standing during entire group. After group, wanted to shake peers hand to \"say good bye\".       Status After Intervention:  Unchanged    Participation Level: Minimal    Participation Quality: Attentive      Speech:  hesitant      Thought Process/Content: Linear      Affective Functioning: Flat      Mood: depressed and fearful      Level of consciousness:  Preoccupied      Response to Learning: Progressing to goal      Endings: None Reported    Modes of Intervention: Education, Support, Socialization and Exploration      Discipline Responsible: Psychoeducational Specialist      Signature:  [place], [address] no discharge

## 2019-07-20 NOTE — PHYSICAL THERAPY INITIAL EVALUATION ADULT - ADDITIONAL COMMENTS
Pt. lives in a private home with 3-4 stairs to enter, and a flight of stairs inside. Pt. reports ambulating and performing ADLs. independently without a device. Pt. complained of dizziness throughout session, despite dizziness, pt. in agreement to participate throughout session. Pt. returned semi-supine in bed with all tubes/lines intact, call bell in reach and in NAD.

## 2019-07-20 NOTE — H&P ADULT - PROBLEM SELECTOR PLAN 3
DVT ppx: Heparin SQ CT angio head negative   pain mgt - Likely 2/2 vertigo, CTH without any acute findings  - pain mgt with tylenol prn

## 2019-07-20 NOTE — H&P ADULT - ASSESSMENT
62 yo F with no significant PMHx presenting with dizziness and 10/10 frontal headache associated with nausea and vomiting x 1 day 64 yo F with no significant PMHx presenting with dizziness and 10/10 frontal headache associated with nausea and vomiting x 1 day concerning for BPPV.

## 2019-07-21 LAB
ANION GAP SERPL CALC-SCNC: 8 MMO/L — SIGNIFICANT CHANGE UP (ref 7–14)
BACTERIA UR CULT: SIGNIFICANT CHANGE UP
BUN SERPL-MCNC: 11 MG/DL — SIGNIFICANT CHANGE UP (ref 7–23)
CALCIUM SERPL-MCNC: 8.7 MG/DL — SIGNIFICANT CHANGE UP (ref 8.4–10.5)
CHLORIDE SERPL-SCNC: 112 MMOL/L — HIGH (ref 98–107)
CO2 SERPL-SCNC: 24 MMOL/L — SIGNIFICANT CHANGE UP (ref 22–31)
CREAT SERPL-MCNC: 0.83 MG/DL — SIGNIFICANT CHANGE UP (ref 0.5–1.3)
GLUCOSE SERPL-MCNC: 108 MG/DL — HIGH (ref 70–99)
HCT VFR BLD CALC: 35.8 % — SIGNIFICANT CHANGE UP (ref 34.5–45)
HCV AB S/CO SERPL IA: 0.1 S/CO — SIGNIFICANT CHANGE UP (ref 0–0.99)
HCV AB SERPL-IMP: SIGNIFICANT CHANGE UP
HGB BLD-MCNC: 11.6 G/DL — SIGNIFICANT CHANGE UP (ref 11.5–15.5)
MAGNESIUM SERPL-MCNC: 2.2 MG/DL — SIGNIFICANT CHANGE UP (ref 1.6–2.6)
MCHC RBC-ENTMCNC: 30.8 PG — SIGNIFICANT CHANGE UP (ref 27–34)
MCHC RBC-ENTMCNC: 32.4 % — SIGNIFICANT CHANGE UP (ref 32–36)
MCV RBC AUTO: 95 FL — SIGNIFICANT CHANGE UP (ref 80–100)
NRBC # FLD: 0 K/UL — SIGNIFICANT CHANGE UP (ref 0–0)
PLATELET # BLD AUTO: 196 K/UL — SIGNIFICANT CHANGE UP (ref 150–400)
PMV BLD: 9.5 FL — SIGNIFICANT CHANGE UP (ref 7–13)
POTASSIUM SERPL-MCNC: 3.7 MMOL/L — SIGNIFICANT CHANGE UP (ref 3.5–5.3)
POTASSIUM SERPL-SCNC: 3.7 MMOL/L — SIGNIFICANT CHANGE UP (ref 3.5–5.3)
RBC # BLD: 3.77 M/UL — LOW (ref 3.8–5.2)
RBC # FLD: 13 % — SIGNIFICANT CHANGE UP (ref 10.3–14.5)
SODIUM SERPL-SCNC: 144 MMOL/L — SIGNIFICANT CHANGE UP (ref 135–145)
SPECIMEN SOURCE: SIGNIFICANT CHANGE UP
WBC # BLD: 5.56 K/UL — SIGNIFICANT CHANGE UP (ref 3.8–10.5)
WBC # FLD AUTO: 5.56 K/UL — SIGNIFICANT CHANGE UP (ref 3.8–10.5)

## 2019-07-21 PROCEDURE — 99232 SBSQ HOSP IP/OBS MODERATE 35: CPT

## 2019-07-21 RX ORDER — MECLIZINE HCL 12.5 MG
1 TABLET ORAL
Qty: 90 | Refills: 0
Start: 2019-07-21 | End: 2019-08-19

## 2019-07-21 RX ORDER — MECLIZINE HCL 12.5 MG
12.5 TABLET ORAL THREE TIMES A DAY
Refills: 0 | Status: DISCONTINUED | OUTPATIENT
Start: 2019-07-21 | End: 2019-07-23

## 2019-07-21 RX ADMIN — SODIUM CHLORIDE 3 MILLILITER(S): 9 INJECTION INTRAMUSCULAR; INTRAVENOUS; SUBCUTANEOUS at 21:36

## 2019-07-21 RX ADMIN — HEPARIN SODIUM 5000 UNIT(S): 5000 INJECTION INTRAVENOUS; SUBCUTANEOUS at 17:20

## 2019-07-21 RX ADMIN — SODIUM CHLORIDE 3 MILLILITER(S): 9 INJECTION INTRAMUSCULAR; INTRAVENOUS; SUBCUTANEOUS at 05:19

## 2019-07-21 RX ADMIN — Medication 1 MILLIGRAM(S): at 12:26

## 2019-07-21 RX ADMIN — Medication 12.5 MILLIGRAM(S): at 10:35

## 2019-07-21 RX ADMIN — SODIUM CHLORIDE 3 MILLILITER(S): 9 INJECTION INTRAMUSCULAR; INTRAVENOUS; SUBCUTANEOUS at 14:15

## 2019-07-21 RX ADMIN — Medication 25 MILLIGRAM(S): at 05:19

## 2019-07-21 RX ADMIN — HEPARIN SODIUM 5000 UNIT(S): 5000 INJECTION INTRAVENOUS; SUBCUTANEOUS at 05:19

## 2019-07-21 NOTE — PROGRESS NOTE ADULT - ATTENDING COMMENTS
dispo: d/c home pending repeat lactate level
dispo: d/c home today if able to ambulate and dizziness improves  Time spent on discharge 35 minutes coordinating discharge plan and discussing with patient and family.

## 2019-07-21 NOTE — PROGRESS NOTE ADULT - PROBLEM SELECTOR PLAN 1
- Given her symptom onset and examination findings, she likely has BPPV  - Seen by neurology who also agrees patient likely has BPPV  - reduce meclizine dose to 12.5 mg tid prn, c/w prn xanax  -monitor on tele  -negative finger to nose dysmetria and negative heel to shin test, argues against central vertigo  - Low suspicion for CVA especially since her symptoms have resolved and her examination shows unidirectional horizontal nystagmus  - CT angio head- negative  - ruled out for MI with negative troponins x2 Trop <6   - Appreciate neuro recs  -seen by PT, suggest home with outpt vestibular services

## 2019-07-22 DIAGNOSIS — R00.1 BRADYCARDIA, UNSPECIFIED: ICD-10-CM

## 2019-07-22 LAB
ANION GAP SERPL CALC-SCNC: 11 MMO/L — SIGNIFICANT CHANGE UP (ref 7–14)
BUN SERPL-MCNC: 10 MG/DL — SIGNIFICANT CHANGE UP (ref 7–23)
CALCIUM SERPL-MCNC: 8.9 MG/DL — SIGNIFICANT CHANGE UP (ref 8.4–10.5)
CHLORIDE SERPL-SCNC: 109 MMOL/L — HIGH (ref 98–107)
CO2 SERPL-SCNC: 23 MMOL/L — SIGNIFICANT CHANGE UP (ref 22–31)
CREAT SERPL-MCNC: 0.82 MG/DL — SIGNIFICANT CHANGE UP (ref 0.5–1.3)
GLUCOSE SERPL-MCNC: 99 MG/DL — SIGNIFICANT CHANGE UP (ref 70–99)
HCT VFR BLD CALC: 39.5 % — SIGNIFICANT CHANGE UP (ref 34.5–45)
HGB BLD-MCNC: 12.7 G/DL — SIGNIFICANT CHANGE UP (ref 11.5–15.5)
MAGNESIUM SERPL-MCNC: 2.3 MG/DL — SIGNIFICANT CHANGE UP (ref 1.6–2.6)
MCHC RBC-ENTMCNC: 30.9 PG — SIGNIFICANT CHANGE UP (ref 27–34)
MCHC RBC-ENTMCNC: 32.2 % — SIGNIFICANT CHANGE UP (ref 32–36)
MCV RBC AUTO: 96.1 FL — SIGNIFICANT CHANGE UP (ref 80–100)
NRBC # FLD: 0 K/UL — SIGNIFICANT CHANGE UP (ref 0–0)
PLATELET # BLD AUTO: 220 K/UL — SIGNIFICANT CHANGE UP (ref 150–400)
PMV BLD: 10.2 FL — SIGNIFICANT CHANGE UP (ref 7–13)
POTASSIUM SERPL-MCNC: 3.9 MMOL/L — SIGNIFICANT CHANGE UP (ref 3.5–5.3)
POTASSIUM SERPL-SCNC: 3.9 MMOL/L — SIGNIFICANT CHANGE UP (ref 3.5–5.3)
RBC # BLD: 4.11 M/UL — SIGNIFICANT CHANGE UP (ref 3.8–5.2)
RBC # FLD: 12.7 % — SIGNIFICANT CHANGE UP (ref 10.3–14.5)
SODIUM SERPL-SCNC: 143 MMOL/L — SIGNIFICANT CHANGE UP (ref 135–145)
WBC # BLD: 6.16 K/UL — SIGNIFICANT CHANGE UP (ref 3.8–10.5)
WBC # FLD AUTO: 6.16 K/UL — SIGNIFICANT CHANGE UP (ref 3.8–10.5)

## 2019-07-22 PROCEDURE — 99233 SBSQ HOSP IP/OBS HIGH 50: CPT

## 2019-07-22 PROCEDURE — 93010 ELECTROCARDIOGRAM REPORT: CPT

## 2019-07-22 RX ADMIN — HEPARIN SODIUM 5000 UNIT(S): 5000 INJECTION INTRAVENOUS; SUBCUTANEOUS at 05:15

## 2019-07-22 RX ADMIN — SODIUM CHLORIDE 3 MILLILITER(S): 9 INJECTION INTRAMUSCULAR; INTRAVENOUS; SUBCUTANEOUS at 05:16

## 2019-07-22 RX ADMIN — HEPARIN SODIUM 5000 UNIT(S): 5000 INJECTION INTRAVENOUS; SUBCUTANEOUS at 17:09

## 2019-07-22 RX ADMIN — Medication 1 MILLIGRAM(S): at 12:31

## 2019-07-22 RX ADMIN — SODIUM CHLORIDE 3 MILLILITER(S): 9 INJECTION INTRAMUSCULAR; INTRAVENOUS; SUBCUTANEOUS at 21:17

## 2019-07-22 RX ADMIN — SODIUM CHLORIDE 3 MILLILITER(S): 9 INJECTION INTRAMUSCULAR; INTRAVENOUS; SUBCUTANEOUS at 16:15

## 2019-07-22 RX ADMIN — Medication 12.5 MILLIGRAM(S): at 22:04

## 2019-07-22 RX ADMIN — Medication 12.5 MILLIGRAM(S): at 16:18

## 2019-07-22 RX ADMIN — Medication 12.5 MILLIGRAM(S): at 07:48

## 2019-07-22 NOTE — PROGRESS NOTE ADULT - PROBLEM SELECTOR PLAN 1
- Given her symptom onset and examination findings, she likely has BPPV  - Seen by neurology who also agrees patient likely has BPPV  - reduce meclizine dose to 12.5 mg tid prn, c/w prn xanax  -monitor on tele  -negative finger to nose dysmetria and negative heel to shin test, argues against central vertigo  - Low suspicion for CVA especially since her symptoms have resolved and her examination shows unidirectional horizontal nystagmus  - CT angio head- negative  - ruled out for MI with negative troponins x2 Trop <6   - Appreciate neuro recs  -seen by PT, suggest home with outpt vestibular services  will d/c if able to ambulate possibly tomorrow

## 2019-07-23 ENCOUNTER — TRANSCRIPTION ENCOUNTER (OUTPATIENT)
Age: 64
End: 2019-07-23

## 2019-07-23 VITALS
TEMPERATURE: 98 F | HEART RATE: 69 BPM | RESPIRATION RATE: 16 BRPM | OXYGEN SATURATION: 99 % | DIASTOLIC BLOOD PRESSURE: 58 MMHG | SYSTOLIC BLOOD PRESSURE: 130 MMHG

## 2019-07-23 LAB
ANION GAP SERPL CALC-SCNC: 11 MMO/L — SIGNIFICANT CHANGE UP (ref 7–14)
BASOPHILS # BLD AUTO: 0.05 K/UL — SIGNIFICANT CHANGE UP (ref 0–0.2)
BASOPHILS NFR BLD AUTO: 0.8 % — SIGNIFICANT CHANGE UP (ref 0–2)
BUN SERPL-MCNC: 12 MG/DL — SIGNIFICANT CHANGE UP (ref 7–23)
CALCIUM SERPL-MCNC: 9.1 MG/DL — SIGNIFICANT CHANGE UP (ref 8.4–10.5)
CHLORIDE SERPL-SCNC: 107 MMOL/L — SIGNIFICANT CHANGE UP (ref 98–107)
CO2 SERPL-SCNC: 25 MMOL/L — SIGNIFICANT CHANGE UP (ref 22–31)
CREAT SERPL-MCNC: 0.9 MG/DL — SIGNIFICANT CHANGE UP (ref 0.5–1.3)
EOSINOPHIL # BLD AUTO: 0.16 K/UL — SIGNIFICANT CHANGE UP (ref 0–0.5)
EOSINOPHIL NFR BLD AUTO: 2.5 % — SIGNIFICANT CHANGE UP (ref 0–6)
GLUCOSE SERPL-MCNC: 94 MG/DL — SIGNIFICANT CHANGE UP (ref 70–99)
HCT VFR BLD CALC: 38.2 % — SIGNIFICANT CHANGE UP (ref 34.5–45)
HGB BLD-MCNC: 12.4 G/DL — SIGNIFICANT CHANGE UP (ref 11.5–15.5)
IMM GRANULOCYTES NFR BLD AUTO: 0.3 % — SIGNIFICANT CHANGE UP (ref 0–1.5)
LYMPHOCYTES # BLD AUTO: 3.69 K/UL — HIGH (ref 1–3.3)
LYMPHOCYTES # BLD AUTO: 57.3 % — HIGH (ref 13–44)
MAGNESIUM SERPL-MCNC: 2.3 MG/DL — SIGNIFICANT CHANGE UP (ref 1.6–2.6)
MCHC RBC-ENTMCNC: 30.3 PG — SIGNIFICANT CHANGE UP (ref 27–34)
MCHC RBC-ENTMCNC: 32.5 % — SIGNIFICANT CHANGE UP (ref 32–36)
MCV RBC AUTO: 93.4 FL — SIGNIFICANT CHANGE UP (ref 80–100)
MONOCYTES # BLD AUTO: 0.56 K/UL — SIGNIFICANT CHANGE UP (ref 0–0.9)
MONOCYTES NFR BLD AUTO: 8.7 % — SIGNIFICANT CHANGE UP (ref 2–14)
NEUTROPHILS # BLD AUTO: 1.96 K/UL — SIGNIFICANT CHANGE UP (ref 1.8–7.4)
NEUTROPHILS NFR BLD AUTO: 30.4 % — LOW (ref 43–77)
NRBC # FLD: 0.02 K/UL — SIGNIFICANT CHANGE UP (ref 0–0)
PLATELET # BLD AUTO: 209 K/UL — SIGNIFICANT CHANGE UP (ref 150–400)
PMV BLD: 10.1 FL — SIGNIFICANT CHANGE UP (ref 7–13)
POTASSIUM SERPL-MCNC: 3.6 MMOL/L — SIGNIFICANT CHANGE UP (ref 3.5–5.3)
POTASSIUM SERPL-SCNC: 3.6 MMOL/L — SIGNIFICANT CHANGE UP (ref 3.5–5.3)
RBC # BLD: 4.09 M/UL — SIGNIFICANT CHANGE UP (ref 3.8–5.2)
RBC # FLD: 12.3 % — SIGNIFICANT CHANGE UP (ref 10.3–14.5)
SODIUM SERPL-SCNC: 143 MMOL/L — SIGNIFICANT CHANGE UP (ref 135–145)
WBC # BLD: 6.44 K/UL — SIGNIFICANT CHANGE UP (ref 3.8–10.5)
WBC # FLD AUTO: 6.44 K/UL — SIGNIFICANT CHANGE UP (ref 3.8–10.5)

## 2019-07-23 PROCEDURE — 99239 HOSP IP/OBS DSCHRG MGMT >30: CPT

## 2019-07-23 RX ADMIN — Medication 1 MILLIGRAM(S): at 12:39

## 2019-07-23 RX ADMIN — HEPARIN SODIUM 5000 UNIT(S): 5000 INJECTION INTRAVENOUS; SUBCUTANEOUS at 06:57

## 2019-07-23 RX ADMIN — Medication 12.5 MILLIGRAM(S): at 10:10

## 2019-07-23 RX ADMIN — SODIUM CHLORIDE 3 MILLILITER(S): 9 INJECTION INTRAMUSCULAR; INTRAVENOUS; SUBCUTANEOUS at 06:56

## 2019-07-23 NOTE — DISCHARGE NOTE NURSING/CASE MANAGEMENT/SOCIAL WORK - NSDCDPATPORTLINK_GEN_ALL_CORE
You can access the [facility], offered by [facility], by registering with the following website: [email or web address]

## 2019-07-23 NOTE — PROGRESS NOTE ADULT - PROBLEM SELECTOR PLAN 2
- Patient with elevated lactate of 2.8 on initial lab work, received 2L NS but her repeat lactate increased to 3.9, received another 1L NS after  - Currently her BPs are well controlled, no tachycardia, not on any medications that would cause lactate elevations, no known history of HIV, malignancy, or mitochondrial dysfunction  -benign abdominal exam, no abdominal pain or clear evidence of bowel ischemia/infarct  - repeat lactate in AM , if remains elevated will consider CTA abd to r/o ischemia  -if lactate level normalizes, pt can be discharged home meclizine prn
- Patient with elevated lactate of 2.8, repeat 3.9  -s/p IVF NS   -lactate now normalized, repeat lactate 1.3-->1.0  -low suspicion for ischemic bowel with benign abdomen

## 2019-07-23 NOTE — PROGRESS NOTE ADULT - PROBLEM SELECTOR PLAN 1
- Given her symptom onset and examination findings, she likely has BPPV  - Seen by neurology who also agrees patient likely has BPPV  - reduce meclizine dose to 12.5 mg tid prn, c/w prn xanax  -monitor on tele  -negative finger to nose dysmetria and negative heel to shin test, argues against central vertigo  - Low suspicion for CVA especially since her symptoms have resolved and her examination shows unidirectional horizontal nystagmus  - CT angio head- negative  - ruled out for MI with negative troponins x2 Trop <6   - Appreciate neuro recs  -seen by PT, suggest home with outpt vestibular services  dizziness has improved with meclizine, d/c today, d/c planning time spent in coordination 45 mts ( discussed with pmd Dr. Fitzclaude Grant at [phone] about pt's sinus bradycardia and for f/u, discussion with PA, preparing d/c plan and summary)

## 2019-07-23 NOTE — PROGRESS NOTE ADULT - PROBLEM SELECTOR PLAN 3
- Likely 2/2 vertigo, CTH without any acute findings  - pain mgt with tylenol prn
- Likely 2/2 vertigo, CTH without any acute findings  - pain management with tylenol prn

## 2019-07-23 NOTE — PROGRESS NOTE ADULT - PROBLEM SELECTOR PLAN 5
EKG on 7/22 revealed sinus celena, TSH was nl, outpt f/u, discussed with pmd, pt reports that she had check up with a cardiology 1 month ago and was told everything was ok., pt was also advised to f/u as outpt.

## 2019-07-23 NOTE — PROGRESS NOTE ADULT - SUBJECTIVE AND OBJECTIVE BOX
Kat Adams MD  Pager [phone]    CHIEF COMPLAINT: Patient is a 63y old  female who presents with a chief complaint of dizziness (2019 01:20)      SUBJECTIVE / OVERNIGHT EVENTS:  pt denies chest pain or sob or abd pain, dizziness improved, denies spinning sensation    MEDICATIONS  (STANDING):  folic acid 1 milliGRAM(s) Oral daily  heparin  Injectable 5000 Unit(s) SubCutaneous every 12 hours  meclizine 25 milliGRAM(s) Oral three times a day  sodium chloride 0.9% lock flush 3 milliLiter(s) IV Push every 8 hours    MEDICATIONS  (PRN):  acetaminophen   Tablet .. 650 milliGRAM(s) Oral every 6 hours PRN Moderate Pain (4 - 6)  ALPRAZolam 0.25 milliGRAM(s) Oral daily PRN anxiety  ondansetron Injectable 4 milliGRAM(s) IV Push every 6 hours PRN Nausea and/or Vomiting      VITALS:  T(F): 98.8 (19 @ 10:32), Max: 98.8 (19 @ 10:32)  HR: 61 (19 @ 10:32) (60 - 72)  BP: 137/70 (19 @ 10:32) (126/71 - 161/86)  RR: 16 (19 @ 10:32) (16 - 18)  SpO2: 99% (19 @ 10:32)  Height (cm): 167.6 (02:07)  Weight (kg): 84.5 (02:07)  BMI (kg/m2): 30.1 (02:07)    CAPILLARY BLOOD GLUCOSE    Output   Height (cm): 167.6 (02:07)  Weight (kg): 84.5 (02:07)  BMI (kg/m2): 30.1 (02:07)  I&O's Summary  T(F): 98.8 (19 @ 10:32), Max: 98.8 (19 @ 10:32)  HR: 61 (19 @ 10:32) (60 - 72)  BP: 137/70 (19 @ 10:32) (126/71 - 161/86)  RR: 16 (19 @ 10:32) (16 - 18)  SpO2: 99% (19 @ 10:32)    PHYSICAL EXAM:  GENERAL: NAD, well-developed  HEAD:  Atraumatic, Normocephalic  EYES: EOMI, PERRLA, conjunctiva and sclera clear  NECK: Supple, No JVD  CHEST/LUNG: Clear to auscultation bilaterally; No wheeze  HEART: Regular rate and rhythm; No murmurs, rubs, or gallops  ABDOMEN: Soft, Nontender, Nondistended; Bowel sounds present  EXTREMITIES:  2+ Peripheral Pulses, No clubbing, cyanosis, or edema  PSYCH: AAOx3  NEUROLOGY: non-focal, no finger to nose dysmetria   SKIN: No rashes or lesions    LABS:              11.6                 139  | 18   | 8            5.44  >-----------< 202     ------------------------< 145                   35.9                 3.7  | 107  | 0.67                                         Ca 8.5   Mg 1.9   Ph 2.4         TPro  7.2  /  Alb  4.0      TBili  0.3  /  DBili  x         AST  21  /  ALT  28            AlkPhos  56        CARDIAC MARKERS  x     / 159 u/L / 1.74 ng/mL      Urinalysis Basic - ( 2019 21:35 )    Color: LIGHT YELLOW / Appearance: CLEAR / S.029 / pH: 7.5  Gluc: 30 / Ketone: TRACE  / Bili: NEGATIVE / Urobili: NORMAL   Blood: NEGATIVE / Protein: 10 / Nitrite: NEGATIVE   Leuk Esterase: NEGATIVE / RBC: x / WBC x   Sq Epi: x / Non Sq Epi: x / Bacteria: x        VB-19 @ 21:00  7.32/44/50/21/79.9/-3.2    VB-19 @ 18:17  7.43/37/35/24/67.0/0.5    MICROBIOLOGY:    RADIOLOGY & ADDITIONAL TESTS:    Imaging Personally Reviewed:  < from: CT Angio Head w/ IV Cont (19 @ 20:50) >  IMPRESSION:    CT BRAIN: No acute intracranial hemorrhage, mass effect, or evidence of   acute vascular territorial infarction.    If clinical symptoms persist or worsen, more sensitive evaluation with   brain MRI may be obtained, if no contraindications exist.    CTA HEAD: Mildly limited examination. No proximal large vessel occlusion.      [ ] Consultant(s) Notes Reviewed:  [x ] Care Discussed with Consultants/Other Providers: tele PA Mikaela, unremarkable CT brain/CTA head, repeat lactate level, if normalizes pt can be discharged home today
Kat Adams MD  Pager [phone]    CHIEF COMPLAINT: Patient is a 63y old  female who presents with a chief complaint of dizziness (2019 16:11)      SUBJECTIVE / OVERNIGHT EVENTS:  pt denies chest pain or sob, still dizzy and hasn't ambulated today , wants meclizine dose reduction, denies dysphagia/focal weakness/dysarthria/diplopia    MEDICATIONS  (STANDING):  folic acid 1 milliGRAM(s) Oral daily  heparin  Injectable 5000 Unit(s) SubCutaneous every 12 hours  sodium chloride 0.9% lock flush 3 milliLiter(s) IV Push every 8 hours    MEDICATIONS  (PRN):  acetaminophen   Tablet .. 650 milliGRAM(s) Oral every 6 hours PRN Moderate Pain (4 - 6)  ALPRAZolam 0.25 milliGRAM(s) Oral daily PRN anxiety  meclizine 12.5 milliGRAM(s) Oral three times a day PRN Dizziness  ondansetron Injectable 4 milliGRAM(s) IV Push every 6 hours PRN Nausea and/or Vomiting      VITALS:  T(F): 97.5 (19 @ 08:52), Max: 98.6 (19 @ 21:50)  HR: 48 (19 @ 08:52) (48 - 71)  BP: 129/73 (19 @ 08:52) (106/71 - 135/72)  RR: 17 (19 @ 08:52) (16 - 18)  SpO2: 97% (19 @ 08:52)      CAPILLARY BLOOD GLUCOSE    Output     I&O's Summary  T(F): 97.5 (19 @ 08:52), Max: 98.6 (19 @ 21:50)  HR: 48 (19 @ 08:52) (48 - 71)  BP: 129/73 (19 @ 08:52) (106/71 - 135/72)  RR: 17 (19 @ 08:52) (16 - 18)  SpO2: 97% (19 @ 08:52)    PHYSICAL EXAM:  GENERAL: NAD, well-developed  HEAD:  Atraumatic, Normocephalic  EYES: EOMI, PERRLA, conjunctiva and sclera clear  NECK: Supple, No JVD  CHEST/LUNG: Clear to auscultation bilaterally; No wheeze  HEART: Regular rate and rhythm; No murmurs, rubs, or gallops  ABDOMEN: Soft, Nontender, Nondistended; Bowel sounds present  EXTREMITIES:  2+ Peripheral Pulses, No clubbing, cyanosis, or edema  PSYCH: AAOx3  NEUROLOGY: non-focal, no finger to nose dysmetria , negative heel to shin test  SKIN: No rashes or lesions    LABS:              11.6                 144  | 24   | 11           5.56  >-----------< 196     ------------------------< 108                   35.8                 3.7  | 112  | 0.83                                         Ca 8.7   Mg 2.2   Ph x           TPro  7.2  /  Alb  4.0      TBili  0.3  /  DBili  x         AST  21  /  ALT  28            AlkPhos  56        CARDIAC MARKERS  x     / 159 u/L / 1.74 ng/mL      Urinalysis Basic - ( 2019 21:35 )    Color: LIGHT YELLOW / Appearance: CLEAR / S.029 / pH: 7.5  Gluc: 30 / Ketone: TRACE  / Bili: NEGATIVE / Urobili: NORMAL   Blood: NEGATIVE / Protein: 10 / Nitrite: NEGATIVE   Leuk Esterase: NEGATIVE / RBC: x / WBC x   Sq Epi: x / Non Sq Epi: x / Bacteria: x        VB-19 @ 21:00  7.32/44/50/21/79.9/-3.2    VB-19 @ 18:17  7.43/37/35/24/67.0/0.5        MICROBIOLOGY:    Culture - Urine (collected 2019 22:19)  Source: URINE MIDSTREAM  Final Report (2019 06:07):    Normal genitourinary niecy      RADIOLOGY & ADDITIONAL TESTS:    Imaging Personally Reviewed:    [ ] Consultant(s) Notes Reviewed:  [x ] Care Discussed with Consultants/Other Providers: trace Al PT f/u, ambulate patient, if able to ambulate and dizziness improves, then pt can be discharged home, reduce meclizine to 12.5 mg tid
Patient is a 63y old  Female who presents with a chief complaint of dizziness (21 Jul 2019 12:43)      SUBJECTIVE / OVERNIGHT EVENTS: Pt seen and examined at 11:45am, no overnight events, tele with sinus celena at 56/mt, overnight had 44/mt, pt still with dizziness, denies any nauea or vomiting, now on reduced dose of meclizine, no other new issues reported.    MEDICATIONS  (STANDING):  folic acid 1 milliGRAM(s) Oral daily  heparin  Injectable 5000 Unit(s) SubCutaneous every 12 hours  sodium chloride 0.9% lock flush 3 milliLiter(s) IV Push every 8 hours    MEDICATIONS  (PRN):  acetaminophen   Tablet .. 650 milliGRAM(s) Oral every 6 hours PRN Moderate Pain (4 - 6)  ALPRAZolam 0.25 milliGRAM(s) Oral daily PRN anxiety  meclizine 12.5 milliGRAM(s) Oral three times a day PRN Dizziness  ondansetron Injectable 4 milliGRAM(s) IV Push every 6 hours PRN Nausea and/or Vomiting      Vital Signs Last 24 Hrs  T(C): 36.4 (22 Jul 2019 05:17), Max: 37.1 (21 Jul 2019 17:00)  T(F): 97.6 (22 Jul 2019 05:17), Max: 98.8 (21 Jul 2019 17:00)  HR: 55 (22 Jul 2019 10:27) (44 - 55)  BP: 138/76 (22 Jul 2019 10:27) (124/83 - 140/76)  BP(mean): --  RR: 18 (22 Jul 2019 10:27) (18 - 18)  SpO2: 100% (22 Jul 2019 10:27) (98% - 100%)  CAPILLARY BLOOD GLUCOSE        I&O's Summary      PHYSICAL EXAM:  GENERAL: NAD, well-developed  CHEST/LUNG: Clear to auscultation bilaterally; No wheeze  HEART: Regular rate and rhythm; No murmurs  ABDOMEN: Soft, Nontender, Nondistended  EXTREMITIES: no LE edema  PSYCH: Calm  NEUROLOGY: AAOx3  SKIN: No rashes or lesions    LABS:                        12.7   6.16  )-----------( 220      ( 22 Jul 2019 05:40 )             39.5     07-22    143  |  109<H>  |  10  ----------------------------<  99  3.9   |  23  |  0.82    Ca    8.9      22 Jul 2019 05:40  Mg     2.3     07-22                RADIOLOGY & ADDITIONAL TESTS:    Imaging Personally Reviewed:    Consultant(s) Notes Reviewed:      Care Discussed with Consultants/Other Providers:
Patient is a 63y old  Female who presents with a chief complaint of dizziness (22 Jul 2019 13:37)      SUBJECTIVE / OVERNIGHT EVENTS: Pt seen and examined at 12:40pm, no overnight events, tele with sinus celena at times to 47/mt, this am 79/mt, pt denies any sob, chest pain or any other complaints, dizziness is much better, able to ambulate well without any issues, no other new issues reported.      MEDICATIONS  (STANDING):  folic acid 1 milliGRAM(s) Oral daily  heparin  Injectable 5000 Unit(s) SubCutaneous every 12 hours  sodium chloride 0.9% lock flush 3 milliLiter(s) IV Push every 8 hours    MEDICATIONS  (PRN):  acetaminophen   Tablet .. 650 milliGRAM(s) Oral every 6 hours PRN Moderate Pain (4 - 6)  ALPRAZolam 0.25 milliGRAM(s) Oral daily PRN anxiety  meclizine 12.5 milliGRAM(s) Oral three times a day PRN Dizziness  ondansetron Injectable 4 milliGRAM(s) IV Push every 6 hours PRN Nausea and/or Vomiting      Vital Signs Last 24 Hrs  T(C): 36.7 (23 Jul 2019 13:03), Max: 36.7 (22 Jul 2019 20:46)  T(F): 98 (23 Jul 2019 13:03), Max: 98.1 (22 Jul 2019 20:46)  HR: 69 (23 Jul 2019 13:03) (53 - 76)  BP: 130/58 (23 Jul 2019 13:03) (130/58 - 139/77)  BP(mean): --  RR: 16 (23 Jul 2019 13:03) (16 - 18)  SpO2: 99% (23 Jul 2019 13:03) (97% - 99%)  CAPILLARY BLOOD GLUCOSE        I&O's Summary      PHYSICAL EXAM:  GENERAL: NAD, well-developed  CHEST/LUNG: Clear to auscultation bilaterally; No wheeze  HEART: Regular rate and rhythm; No murmurs  ABDOMEN: Soft, Nontender, Nondistended  EXTREMITIES: no LE edema  PSYCH: Calm  NEUROLOGY: AAOx3  SKIN: No rashes or lesions      LABS:                        12.4   6.44  )-----------( 209      ( 23 Jul 2019 06:00 )             38.2     07-23    143  |  107  |  12  ----------------------------<  94  3.6   |  25  |  0.90    Ca    9.1      23 Jul 2019 06:00  Mg     2.3     07-23                RADIOLOGY & ADDITIONAL TESTS:    Imaging Personally Reviewed:    Consultant(s) Notes Reviewed:      Care Discussed with Consultants/Other Providers:

## 2019-07-23 NOTE — PROGRESS NOTE ADULT - ASSESSMENT
64 yo F with no significant PMHx presenting with dizziness and 10/10 frontal headache associated with nausea and vomiting x 1 day concerning for BPPV, also found to have lactic acidosis .
62 yo F with no significant PMHx presenting with dizziness and 10/10 frontal headache associated with nausea and vomiting x 1 day concerning for BPPV, also found to have lactic acidosis .
64 yo F with no significant PMHx presenting with dizziness and 10/10 frontal headache associated with nausea and vomiting x 1 day concerning for BPPV, also found to have lactic acidosis .
64 yo F with no significant PMHx presenting with dizziness and 10/10 frontal headache associated with nausea and vomiting x 1 day concerning for BPPV, also found to have lactic acidosis improved,

## 2019-07-23 NOTE — PROGRESS NOTE ADULT - PROBLEM SELECTOR PLAN 4
DVT ppx: Heparin SQ  - Fall precautions

## 2020-12-07 ENCOUNTER — INPATIENT (INPATIENT)
Facility: HOSPITAL | Age: 65
LOS: 0 days | Discharge: HOME | End: 2020-12-08
Attending: SURGERY | Admitting: SURGERY
Payer: MEDICARE

## 2020-12-07 VITALS
TEMPERATURE: 99 F | HEART RATE: 123 BPM | SYSTOLIC BLOOD PRESSURE: 132 MMHG | HEIGHT: 64 IN | WEIGHT: 188.94 LBS | DIASTOLIC BLOOD PRESSURE: 87 MMHG | OXYGEN SATURATION: 98 % | RESPIRATION RATE: 20 BRPM

## 2020-12-07 DIAGNOSIS — Z90.49 ACQUIRED ABSENCE OF OTHER SPECIFIED PARTS OF DIGESTIVE TRACT: Chronic | ICD-10-CM

## 2020-12-07 LAB
ALBUMIN SERPL ELPH-MCNC: 3.2 G/DL — LOW (ref 3.3–5)
ALP SERPL-CCNC: 48 U/L — SIGNIFICANT CHANGE UP (ref 40–120)
ALT FLD-CCNC: 29 U/L — SIGNIFICANT CHANGE UP (ref 12–78)
ANION GAP SERPL CALC-SCNC: 13 MMOL/L — SIGNIFICANT CHANGE UP (ref 5–17)
APTT BLD: 27.2 SEC — LOW (ref 27.5–35.5)
AST SERPL-CCNC: 22 U/L — SIGNIFICANT CHANGE UP (ref 15–37)
BASOPHILS # BLD AUTO: 0.02 K/UL — SIGNIFICANT CHANGE UP (ref 0–0.2)
BASOPHILS NFR BLD AUTO: 0.1 % — SIGNIFICANT CHANGE UP (ref 0–2)
BILIRUB SERPL-MCNC: 0.3 MG/DL — SIGNIFICANT CHANGE UP (ref 0.2–1.2)
BUN SERPL-MCNC: 13 MG/DL — SIGNIFICANT CHANGE UP (ref 7–23)
CALCIUM SERPL-MCNC: 8.2 MG/DL — LOW (ref 8.5–10.1)
CHLORIDE SERPL-SCNC: 105 MMOL/L — SIGNIFICANT CHANGE UP (ref 96–108)
CO2 SERPL-SCNC: 19 MMOL/L — LOW (ref 22–31)
CREAT SERPL-MCNC: 1.21 MG/DL — SIGNIFICANT CHANGE UP (ref 0.5–1.3)
EOSINOPHIL # BLD AUTO: 0 K/UL — SIGNIFICANT CHANGE UP (ref 0–0.5)
EOSINOPHIL NFR BLD AUTO: 0 % — SIGNIFICANT CHANGE UP (ref 0–6)
GLUCOSE SERPL-MCNC: 282 MG/DL — HIGH (ref 70–99)
HCT VFR BLD CALC: 29.3 % — LOW (ref 34.5–45)
HGB BLD-MCNC: 9.8 G/DL — LOW (ref 11.5–15.5)
IMM GRANULOCYTES NFR BLD AUTO: 0.4 % — SIGNIFICANT CHANGE UP (ref 0–1.5)
INR BLD: 1.26 RATIO — HIGH (ref 0.88–1.16)
LACTATE SERPL-SCNC: 7.2 MMOL/L — CRITICAL HIGH (ref 0.7–2)
LYMPHOCYTES # BLD AUTO: 0.5 K/UL — LOW (ref 1–3.3)
LYMPHOCYTES # BLD AUTO: 2.7 % — LOW (ref 13–44)
MCHC RBC-ENTMCNC: 31.3 PG — SIGNIFICANT CHANGE UP (ref 27–34)
MCHC RBC-ENTMCNC: 33.4 GM/DL — SIGNIFICANT CHANGE UP (ref 32–36)
MCV RBC AUTO: 93.6 FL — SIGNIFICANT CHANGE UP (ref 80–100)
MONOCYTES # BLD AUTO: 0.84 K/UL — SIGNIFICANT CHANGE UP (ref 0–0.9)
MONOCYTES NFR BLD AUTO: 4.6 % — SIGNIFICANT CHANGE UP (ref 2–14)
NEUTROPHILS # BLD AUTO: 16.81 K/UL — HIGH (ref 1.8–7.4)
NEUTROPHILS NFR BLD AUTO: 92.2 % — HIGH (ref 43–77)
NRBC # BLD: 0 /100 WBCS — SIGNIFICANT CHANGE UP (ref 0–0)
PLATELET # BLD AUTO: 217 K/UL — SIGNIFICANT CHANGE UP (ref 150–400)
POTASSIUM SERPL-MCNC: 4.2 MMOL/L — SIGNIFICANT CHANGE UP (ref 3.5–5.3)
POTASSIUM SERPL-SCNC: 4.2 MMOL/L — SIGNIFICANT CHANGE UP (ref 3.5–5.3)
PROT SERPL-MCNC: 6.8 GM/DL — SIGNIFICANT CHANGE UP (ref 6–8.3)
PROTHROM AB SERPL-ACNC: 14.5 SEC — HIGH (ref 10.6–13.6)
RBC # BLD: 3.13 M/UL — LOW (ref 3.8–5.2)
RBC # FLD: 12.8 % — SIGNIFICANT CHANGE UP (ref 10.3–14.5)
SODIUM SERPL-SCNC: 137 MMOL/L — SIGNIFICANT CHANGE UP (ref 135–145)
WBC # BLD: 18.25 K/UL — HIGH (ref 3.8–10.5)
WBC # FLD AUTO: 18.25 K/UL — HIGH (ref 3.8–10.5)

## 2020-12-07 PROCEDURE — 71045 X-RAY EXAM CHEST 1 VIEW: CPT | Mod: 26

## 2020-12-07 PROCEDURE — 74177 CT ABD & PELVIS W/CONTRAST: CPT | Mod: 26,MA

## 2020-12-07 PROCEDURE — 93010 ELECTROCARDIOGRAM REPORT: CPT

## 2020-12-07 PROCEDURE — 99285 EMERGENCY DEPT VISIT HI MDM: CPT

## 2020-12-07 RX ORDER — METOCLOPRAMIDE HCL 10 MG
10 TABLET ORAL ONCE
Refills: 0 | Status: COMPLETED | OUTPATIENT
Start: 2020-12-07 | End: 2020-12-07

## 2020-12-07 RX ORDER — DIPHENHYDRAMINE HCL 50 MG
50 CAPSULE ORAL ONCE
Refills: 0 | Status: COMPLETED | OUTPATIENT
Start: 2020-12-07 | End: 2020-12-07

## 2020-12-07 RX ORDER — SODIUM CHLORIDE 9 MG/ML
2700 INJECTION, SOLUTION INTRAVENOUS ONCE
Refills: 0 | Status: COMPLETED | OUTPATIENT
Start: 2020-12-07 | End: 2020-12-07

## 2020-12-07 RX ORDER — PROCHLORPERAZINE MALEATE 5 MG
10 TABLET ORAL ONCE
Refills: 0 | Status: DISCONTINUED | OUTPATIENT
Start: 2020-12-07 | End: 2020-12-07

## 2020-12-07 RX ADMIN — Medication 50 MILLIGRAM(S): at 22:20

## 2020-12-07 RX ADMIN — Medication 10 MILLIGRAM(S): at 22:20

## 2020-12-07 RX ADMIN — SODIUM CHLORIDE 2700 MILLILITER(S): 9 INJECTION, SOLUTION INTRAVENOUS at 22:20

## 2020-12-07 NOTE — ED PROVIDER NOTE - PROGRESS NOTE DETAILS
still tachycardic, CT showing post-surgical changes, lactate elevated 7 to 5, still tachycardic, CT showing post-surgical changes, lactate elevated 7, improved to 5 s/p 2.5L LR. discussed with hospitalist whom requests surgical admit. discussed with surgery whoms accepts.

## 2020-12-07 NOTE — ED PROVIDER NOTE - OBJECTIVE STATEMENT
65F PMHx of appendectomy, recent s/p abdominoplasty, obesity, presenting with lightheadedness/pre-syncope today. Patient was sitting at home and suddenly had onset of lightheadedness, but no LOC. Denies any prodromal chest pain, shortness of breath, headaches, fevers/chills, coughs, trauma, previous syncope episodes. Additional history obtained from Plastic Surgeon Dr. Marvin Rodríguez ([phone]). Patient with routine uncomplicated abdominoplasty, given fluids after surgery, observed for 3 hours without complications and discharged home.

## 2020-12-07 NOTE — ED PROVIDER NOTE - CARE PLAN
Principal Discharge DX:	Surgical complication  Secondary Diagnosis:	Lactate blood increase  Secondary Diagnosis:	Dehydration

## 2020-12-07 NOTE — ED ADULT NURSE NOTE - NSIMPLEMENTINTERV_GEN_ALL_ED
Implemented All Universal Safety Interventions:  [place] to call system. Call bell, personal items and telephone within reach. Instruct patient to call for assistance. Room bathroom lighting operational. Non-slip footwear when patient is off stretcher. Physically safe environment: no spills, clutter or unnecessary equipment. Stretcher in lowest position, wheels locked, appropriate side rails in place.

## 2020-12-07 NOTE — ED ADULT TRIAGE NOTE - CHIEF COMPLAINT QUOTE
pt biba with c/o dizziness and nausea after taking percocet for pain. s/p tummy tuck today  pw 2 pérez drains. pt denies covid/contact, and PMH

## 2020-12-07 NOTE — ED PROVIDER NOTE - PHYSICAL EXAMINATION
VITAL SIGNS: I have reviewed nursing notes and confirm.   GEN: Well-developed; well-nourished; in no acute distress. Speaking full sentences.  SKIN: Warm, pink, no rash, no diaphoresis, no cyanosis, well perfused.   HEAD: Normocephalic; atraumatic. No scalp lacerations, no abrasions.  NECK: Supple; non tender.   EYES: Pupils 3mm equal, round, reactive to light and accomodation, conjunctiva and sclera clear. Extra-ocular movements intact bilaterally.  ENT: No nasal discharge; airway clear. Trachea is midline. ORAL: No oropharyngeal exudates or erythema. Normal dentition.  CV: Regular rate and rhythm. S1, S2 normal; no murmurs, gallops, or rubs. No lower extremity pitting edema bilaterally. Capillary refill < 2 seconds throughout. Distal pulses intact 2+ throughout.  RESP: CTA bilaterally. No wheezes, rales, or rhonchi.   ABD: Normal bowel sounds, soft, non-distended, (+) diffusely ttp, secondary to recent surgery. (+) b/l JESSICA drains w/ serosanguinous drainage, no hepatosplenomegaly. No CVA tenderness bilaterally.  MSK: Normal range of motion and movement of all 4 extremities. No joint or muscular pain throughout. No clubbing.   BACK: No thoracolumbar midline or paravertebral tenderness. No step-offs or obvious deformities.  NEURO: Alert & oriented x 3, Grossly unremarkable. Sensory and motor intact throughout. No focal deficits. Gait: Fluid. Normal speech and coordination.   PSYCH: Cooperative, appropriate.

## 2020-12-08 ENCOUNTER — TRANSCRIPTION ENCOUNTER (OUTPATIENT)
Age: 65
End: 2020-12-08

## 2020-12-08 VITALS
OXYGEN SATURATION: 98 % | RESPIRATION RATE: 19 BRPM | SYSTOLIC BLOOD PRESSURE: 132 MMHG | HEART RATE: 108 BPM | TEMPERATURE: 99 F | DIASTOLIC BLOOD PRESSURE: 83 MMHG

## 2020-12-08 DIAGNOSIS — Z90.49 ACQUIRED ABSENCE OF OTHER SPECIFIED PARTS OF DIGESTIVE TRACT: Chronic | ICD-10-CM

## 2020-12-08 DIAGNOSIS — D64.9 ANEMIA, UNSPECIFIED: ICD-10-CM

## 2020-12-08 DIAGNOSIS — R00.0 TACHYCARDIA, UNSPECIFIED: ICD-10-CM

## 2020-12-08 DIAGNOSIS — D72.829 ELEVATED WHITE BLOOD CELL COUNT, UNSPECIFIED: ICD-10-CM

## 2020-12-08 DIAGNOSIS — N17.9 ACUTE KIDNEY FAILURE, UNSPECIFIED: ICD-10-CM

## 2020-12-08 DIAGNOSIS — Z98.890 OTHER SPECIFIED POSTPROCEDURAL STATES: Chronic | ICD-10-CM

## 2020-12-08 DIAGNOSIS — L76.82 OTHER POSTPROCEDURAL COMPLICATIONS OF SKIN AND SUBCUTANEOUS TISSUE: ICD-10-CM

## 2020-12-08 DIAGNOSIS — R55 SYNCOPE AND COLLAPSE: ICD-10-CM

## 2020-12-08 DIAGNOSIS — E87.2 ACIDOSIS: ICD-10-CM

## 2020-12-08 LAB
ANION GAP SERPL CALC-SCNC: 6 MMOL/L — SIGNIFICANT CHANGE UP (ref 5–17)
APPEARANCE UR: CLEAR — SIGNIFICANT CHANGE UP
BACTERIA # UR AUTO: ABNORMAL
BILIRUB UR-MCNC: NEGATIVE — SIGNIFICANT CHANGE UP
BLD GP AB SCN SERPL QL: SIGNIFICANT CHANGE UP
BUN SERPL-MCNC: 10 MG/DL — SIGNIFICANT CHANGE UP (ref 7–23)
CALCIUM SERPL-MCNC: 8.6 MG/DL — SIGNIFICANT CHANGE UP (ref 8.5–10.1)
CHLORIDE SERPL-SCNC: 111 MMOL/L — HIGH (ref 96–108)
CO2 SERPL-SCNC: 28 MMOL/L — SIGNIFICANT CHANGE UP (ref 22–31)
COLOR SPEC: YELLOW — SIGNIFICANT CHANGE UP
COMMENT - URINE: SIGNIFICANT CHANGE UP
CREAT SERPL-MCNC: 0.87 MG/DL — SIGNIFICANT CHANGE UP (ref 0.5–1.3)
DIFF PNL FLD: ABNORMAL
EPI CELLS # UR: SIGNIFICANT CHANGE UP
FLUAV AG NPH QL: SIGNIFICANT CHANGE UP COUNTS
FLUBV AG NPH QL: SIGNIFICANT CHANGE UP COUNTS
GLUCOSE SERPL-MCNC: 133 MG/DL — HIGH (ref 70–99)
GLUCOSE UR QL: 1000 MG/DL
HCT VFR BLD CALC: 26 % — LOW (ref 34.5–45)
HGB BLD-MCNC: 8.8 G/DL — LOW (ref 11.5–15.5)
HYALINE CASTS # UR AUTO: ABNORMAL /LPF
KETONES UR-MCNC: NEGATIVE — SIGNIFICANT CHANGE UP
LACTATE SERPL-SCNC: 2.1 MMOL/L — HIGH (ref 0.7–2)
LACTATE SERPL-SCNC: 2.8 MMOL/L — HIGH (ref 0.7–2)
LACTATE SERPL-SCNC: 5.3 MMOL/L — CRITICAL HIGH (ref 0.7–2)
LEUKOCYTE ESTERASE UR-ACNC: NEGATIVE — SIGNIFICANT CHANGE UP
MAGNESIUM SERPL-MCNC: 2 MG/DL — SIGNIFICANT CHANGE UP (ref 1.6–2.6)
MCHC RBC-ENTMCNC: 31.9 PG — SIGNIFICANT CHANGE UP (ref 27–34)
MCHC RBC-ENTMCNC: 33.8 GM/DL — SIGNIFICANT CHANGE UP (ref 32–36)
MCV RBC AUTO: 94.2 FL — SIGNIFICANT CHANGE UP (ref 80–100)
NITRITE UR-MCNC: NEGATIVE — SIGNIFICANT CHANGE UP
NRBC # BLD: 0 /100 WBCS — SIGNIFICANT CHANGE UP (ref 0–0)
PH UR: 5 — SIGNIFICANT CHANGE UP (ref 5–8)
PHOSPHATE SERPL-MCNC: 2.5 MG/DL — SIGNIFICANT CHANGE UP (ref 2.5–4.5)
PLATELET # BLD AUTO: 206 K/UL — SIGNIFICANT CHANGE UP (ref 150–400)
POTASSIUM SERPL-MCNC: 4 MMOL/L — SIGNIFICANT CHANGE UP (ref 3.5–5.3)
POTASSIUM SERPL-SCNC: 4 MMOL/L — SIGNIFICANT CHANGE UP (ref 3.5–5.3)
PROT UR-MCNC: NEGATIVE MG/DL — SIGNIFICANT CHANGE UP
RBC # BLD: 2.76 M/UL — LOW (ref 3.8–5.2)
RBC # FLD: 13.1 % — SIGNIFICANT CHANGE UP (ref 10.3–14.5)
RBC CASTS # UR COMP ASSIST: SIGNIFICANT CHANGE UP /HPF (ref 0–4)
RSV RNA NPH QL NAA+NON-PROBE: SIGNIFICANT CHANGE UP COUNTS
SARS-COV-2 IGG SERPL QL IA: POSITIVE
SARS-COV-2 IGM SERPL IA-ACNC: 6.44 INDEX — HIGH
SARS-COV-2 RNA SPEC QL NAA+PROBE: SIGNIFICANT CHANGE UP COUNTS
SODIUM SERPL-SCNC: 145 MMOL/L — SIGNIFICANT CHANGE UP (ref 135–145)
SP GR SPEC: 1.01 — SIGNIFICANT CHANGE UP (ref 1.01–1.02)
UROBILINOGEN FLD QL: NEGATIVE MG/DL — SIGNIFICANT CHANGE UP
WBC # BLD: 12.56 K/UL — HIGH (ref 3.8–10.5)
WBC # FLD AUTO: 12.56 K/UL — HIGH (ref 3.8–10.5)
WBC UR QL: SIGNIFICANT CHANGE UP

## 2020-12-08 PROCEDURE — 99233 SBSQ HOSP IP/OBS HIGH 50: CPT

## 2020-12-08 RX ORDER — SODIUM CHLORIDE 9 MG/ML
1000 INJECTION, SOLUTION INTRAVENOUS ONCE
Refills: 0 | Status: COMPLETED | OUTPATIENT
Start: 2020-12-08 | End: 2020-12-08

## 2020-12-08 RX ORDER — ONDANSETRON 8 MG/1
4 TABLET, FILM COATED ORAL EVERY 6 HOURS
Refills: 0 | Status: DISCONTINUED | OUTPATIENT
Start: 2020-12-08 | End: 2020-12-08

## 2020-12-08 RX ORDER — ACETAMINOPHEN 500 MG
650 TABLET ORAL EVERY 6 HOURS
Refills: 0 | Status: DISCONTINUED | OUTPATIENT
Start: 2020-12-08 | End: 2020-12-08

## 2020-12-08 RX ORDER — OXYCODONE AND ACETAMINOPHEN 5; 325 MG/1; MG/1
1 TABLET ORAL EVERY 6 HOURS
Refills: 0 | Status: DISCONTINUED | OUTPATIENT
Start: 2020-12-08 | End: 2020-12-08

## 2020-12-08 RX ORDER — HEPARIN SODIUM 5000 [USP'U]/ML
5000 INJECTION INTRAVENOUS; SUBCUTANEOUS EVERY 12 HOURS
Refills: 0 | Status: DISCONTINUED | OUTPATIENT
Start: 2020-12-08 | End: 2020-12-08

## 2020-12-08 RX ORDER — SODIUM CHLORIDE 9 MG/ML
1000 INJECTION, SOLUTION INTRAVENOUS
Refills: 0 | Status: DISCONTINUED | OUTPATIENT
Start: 2020-12-08 | End: 2020-12-08

## 2020-12-08 RX ORDER — PIPERACILLIN AND TAZOBACTAM 4; .5 G/20ML; G/20ML
3.38 INJECTION, POWDER, LYOPHILIZED, FOR SOLUTION INTRAVENOUS ONCE
Refills: 0 | Status: COMPLETED | OUTPATIENT
Start: 2020-12-08 | End: 2020-12-08

## 2020-12-08 RX ADMIN — SODIUM CHLORIDE 1000 MILLILITER(S): 9 INJECTION, SOLUTION INTRAVENOUS at 03:30

## 2020-12-08 RX ADMIN — Medication 1 TABLET(S): at 06:50

## 2020-12-08 RX ADMIN — HEPARIN SODIUM 5000 UNIT(S): 5000 INJECTION INTRAVENOUS; SUBCUTANEOUS at 06:51

## 2020-12-08 RX ADMIN — SODIUM CHLORIDE 125 MILLILITER(S): 9 INJECTION, SOLUTION INTRAVENOUS at 08:59

## 2020-12-08 RX ADMIN — SODIUM CHLORIDE 1000 MILLILITER(S): 9 INJECTION, SOLUTION INTRAVENOUS at 02:20

## 2020-12-08 RX ADMIN — SODIUM CHLORIDE 2700 MILLILITER(S): 9 INJECTION, SOLUTION INTRAVENOUS at 01:49

## 2020-12-08 RX ADMIN — PIPERACILLIN AND TAZOBACTAM 3.38 GRAM(S): 4; .5 INJECTION, POWDER, LYOPHILIZED, FOR SOLUTION INTRAVENOUS at 03:00

## 2020-12-08 RX ADMIN — PIPERACILLIN AND TAZOBACTAM 200 GRAM(S): 4; .5 INJECTION, POWDER, LYOPHILIZED, FOR SOLUTION INTRAVENOUS at 02:20

## 2020-12-08 NOTE — CONSULT NOTE ADULT - PROBLEM SELECTOR RECOMMENDATION 6
s/p Abdominoplasty, Arm and Butt lift 12/7  - surgical wounds w/ JESSICA drain and dressing in place   - Augmentin on board, analgesics prn   - mgmt as per Surgery -unlikely infectious etiology   -Trending down w/ IV hydration -unlikely infectious etiology   -Trended down w/ IV hydration

## 2020-12-08 NOTE — CONSULT NOTE ADULT - PROBLEM SELECTOR PROBLEM 4
Other postoperative complication of subcutaneous tissue Acute renal failure, unspecified acute renal failure type

## 2020-12-08 NOTE — DISCHARGE NOTE NURSING/CASE MANAGEMENT/SOCIAL WORK - PATIENT PORTAL LINK FT
You can access the FollowMyHealth Patient Portal offered by [facility] by registering at the following website: [email or web address]. By joining [facility]’s FollowMyHealth portal, you will also be able to view your health information using other applications (apps) compatible with our system.

## 2020-12-08 NOTE — DISCHARGE NOTE PROVIDER - HOSPITAL COURSE
66 y/o female PMHx pre-diabetes, open appendectomy, and recent abdominoplasty, Brazilian butt lift, and arm surgery yesterday 12/7 with Dr. Marvin Rodríguez c/o "passing out" post-operatively. Reports that she passed out briefly in the PACU and was sent home. At home, she passed out for one second as per her family. Tolerating regular diet. Has mild abdominal soreness, no pain. Patient denies fever, chills, nausea, vomiting, constipation, diarrhea, melena, hematochezia, dysuria, hematuria, chest pain, shortness of breath, dizziness, cough. ER physician Dr. Quinn discussed patient, imaging, and labs with Dr. Rodríguez. He reports a normal operation and post-op care in the PACU. Dr. Rodríguez wants patient to be admitted to the hospital overnight for observation, IV hydration, and to trend the lactate.    ER workup revealed Leukocytosis (18) and stable H/H with lactic acidosis (7.2). Patient's lactate trended down after IVF resuscitation.   Patient was admitted to General Surgery and Hospitalist was consulted for syncopal workup. Leukocytosis improved. Wounds clean, no active bleeding.   Patient clinically improved, tolerating regular diet with no complaints, postop pain well controlled.    64 y/o female PMHx pre-diabetes, open appendectomy, and recent abdominoplasty, Brazilian butt lift, and arm surgery yesterday 12/7 with Dr. Marvin Rodríguez c/o "passing out" post-operatively. Reports that she passed out briefly in the PACU and was sent home. At home, she passed out for one second as per her family. Tolerating regular diet. Has mild abdominal soreness, no pain. Patient denies fever, chills, nausea, vomiting, constipation, diarrhea, melena, hematochezia, dysuria, hematuria, chest pain, shortness of breath, dizziness, cough. ER physician Dr. Quinn discussed patient, imaging, and labs with Dr. Rodríguez. He reports a normal operation and post-op care in the PACU. Dr. Rodríguez wants patient to be admitted to the hospital overnight for observation, IV hydration, and to trend the lactate.    ER workup revealed Leukocytosis (18) and stable H/H with lactic acidosis (7.2). Patient's lactate trended down after IVF resuscitation.   Patient was admitted to General Surgery and Hospitalist was consulted for syncopal workup. Leukocytosis improved. Wounds clean, no active bleeding.   Patient clinically improved, tolerating regular diet with no complaints, postop pain well controlled.   Patient was discharged in stable condition once medically stable for d/c, orthostatic vitals normal and cleared by Dr. Edge for discharge.

## 2020-12-08 NOTE — H&P ADULT - HISTORY OF PRESENT ILLNESS
64 y/o female PMHx pre-diabetes and recent abdominoplasty, Brazilian butt lift, and arm surgery yesterday 12/7 with Dr. Marvin Rodríguez c/o "passing out" post-operatively. Reports that she passed out briefly in the PACU and was sent home. She passed out for one second as per her family. Tolerating regular diet. Has mild abdominal soreness, no pain. Patient denies fever, chills, nausea, vomiting, constipation, diarrhea, melena, hematochezia, dysuria, hematuria, chest pain, shortness of breath, dizziness, cough. ER physician Dr. Quinn discussed patient, imaging, and labs with Dr. Rodríguez. He reports a normal operation and post-op care in the PACU. Dr. Rodríguez wants patient to be admitted to the hospital overnight for observation, IV hydration, and to trend the lactate. 64 y/o female PMHx pre-diabetes and recent abdominoplasty, Brazilian butt lift, and arm surgery yesterday 12/7 with Dr. Marvin Rodríguez c/o "passing out" post-operatively. Reports that she passed out briefly in the PACU and was sent home. At home, she passed out for one second as per her family. Tolerating regular diet. Has mild abdominal soreness, no pain. Patient denies fever, chills, nausea, vomiting, constipation, diarrhea, melena, hematochezia, dysuria, hematuria, chest pain, shortness of breath, dizziness, cough. ER physician Dr. Quinn discussed patient, imaging, and labs with Dr. Rodríguez. He reports a normal operation and post-op care in the PACU. Dr. Rodríguez wants patient to be admitted to the hospital overnight for observation, IV hydration, and to trend the lactate. 66 y/o female PMHx pre-diabetes, open appendectomy, and recent abdominoplasty, Brazilian butt lift, and arm surgery yesterday 12/7 with Dr. Marvin Rodríguez c/o "passing out" post-operatively. Reports that she passed out briefly in the PACU and was sent home. At home, she passed out for one second as per her family. Tolerating regular diet. Has mild abdominal soreness, no pain. Patient denies fever, chills, nausea, vomiting, constipation, diarrhea, melena, hematochezia, dysuria, hematuria, chest pain, shortness of breath, dizziness, cough. ER physician Dr. Quinn discussed patient, imaging, and labs with Dr. Rodríguez. He reports a normal operation and post-op care in the PACU. Dr. Rodríguez wants patient to be admitted to the hospital overnight for observation, IV hydration, and to trend the lactate.

## 2020-12-08 NOTE — CONSULT NOTE ADULT - PROBLEM SELECTOR RECOMMENDATION 9
-suspected secondary to dehydration and Anesthesia   -Syncopal episode lasted for 1 second as per witness  - pt is asymptomatic now   -Pt claims that she had a normal Echo on June 2020  - check orthostatic vitals; if benign the patient may be discharged home w/ PCP follow up   - pt was instructed that if she goes home she must return to ER if she has CP, SOB or lightheadedness

## 2020-12-08 NOTE — H&P ADULT - NSICDXPASTSURGICALHX_GEN_ALL_CORE_FT
PAST SURGICAL HISTORY:  H/O abdominoplasty brazilian butt lift, and arm surgery 12/7/20     PAST SURGICAL HISTORY:  H/O abdominoplasty brazilian butt lift, and arm surgery 12/7/20    S/P appendectomy Open appendectomy 1998

## 2020-12-08 NOTE — CONSULT NOTE ADULT - SUBJECTIVE AND OBJECTIVE BOX
Patient is a 65y old  Female who presents with a chief complaint of Passed out post-op (08 Dec 2020 10:02)      INTERVAL HPI/ OVERNIGHT EVENTS: Pt was seen and examined at bedside today, she denies any CP, SOB, palpitations and lightheadedness, post surgical wound pain is minimal     MEDICATIONS  (STANDING):  amoxicillin  875 milliGRAM(s)/clavulanate 1 Tablet(s) Oral two times a day  heparin   Injectable 5000 Unit(s) SubCutaneous every 12 hours  lactated ringers. 1000 milliLiter(s) (125 mL/Hr) IV Continuous <Continuous>    MEDICATIONS  (PRN):  acetaminophen   Tablet .. 650 milliGRAM(s) Oral every 6 hours PRN Temp greater or equal to 38C (100.4F), Mild Pain (1 - 3)  ondansetron Injectable 4 milliGRAM(s) IV Push every 6 hours PRN Nausea  oxycodone    5 mG/acetaminophen 325 mG 1 Tablet(s) Oral every 6 hours PRN Severe Pain (7 - 10)      Allergies    No Known Allergies    Intolerances        REVIEW OF SYSTEMS:    Unable to examine due to [ ] Encephalopathy [ ] Advanced Dementia [ ] Expressive Aphasia [ ] Non-verbal patient    CONSTITUTIONAL: No fever, NO generalized weakness/Fatigue, No weight loss  EYES: No eye pain, visual disturbances, or discharge  ENMT:  No difficulty hearing, tinnitus, vertigo; No sinus or throat pain  NECK: No pain or stiffness  RESPIRATORY: No shortness of breath,  cough, wheezing, sputum or hemoptysis   CARDIOVASCULAR: No chest pain, palpitations, or leg swelling  GASTROINTESTINAL: No abdominal pain. No nausea, vomiting, diarrhea or constipation. No melena or hematochezia.  GENITOURINARY: No dysuria, frequency, hematuria, or incontinence  NEUROLOGICAL: No headaches, Dizziness, memory loss, loss of strength, numbness, or tremors  SKIN: Surgical wounds, w/ tenderness    MUSCULOSKELETAL: No joint pain or swelling; No muscle, back, or extremity pain  PSYCHIATRIC: No depression, anxiety, mood swings, or difficulty sleeping  HEME/LYMPH: No easy bruising, or bleeding gums      Vital Signs Last 24 Hrs  T(C): 37.4 (08 Dec 2020 07:37), Max: 37.4 (07 Dec 2020 20:43)  T(F): 99.3 (08 Dec 2020 07:37), Max: 99.3 (07 Dec 2020 20:43)  HR: 112 (08 Dec 2020 07:37) (91 - 123)  BP: 160/82 (08 Dec 2020 07:37) (114/89 - 160/82)  BP(mean): 97 (08 Dec 2020 05:30) (97 - 97)  RR: 18 (08 Dec 2020 07:37) (18 - 25)  SpO2: 96% (08 Dec 2020 07:37) (96% - 98%)    PHYSICAL EXAM:  GENERAL: NAD, well-developed, well-groomed  HEAD:  Atraumatic, Normocephalic  EYES: conjunctiva and sclera clear  ENMT: Moist mucous membranes  NECK: Supple, No JVD, Normal thyroid  CHEST/LUNG: Clear to Auscultation bilaterally; No rales, rhonchi, wheezing, or rubs  HEART: Regular rate and rhythm; No murmurs, rubs, or gallops  ABDOMEN: Soft, Nontender, Nondistended; Bowel sounds present  EXTREMITIES:  2+ Peripheral Pulses, No clubbing, cyanosis, or edema  SKIN: surgical wounds w/ JESSICA drain in place (appears hemorrhagic)   NERVOUS SYSTEM:  Alert & Oriented X3, Good concentration; Motor Strength 5/5 B/L upper and lower extremities    LABS:                        8.8    12.56 )-----------( 206      ( 08 Dec 2020 09:35 )             26.0     12-08    145  |  111<H>  |  10  ----------------------------<  133<H>  4.0   |  28  |  0.87    Ca    8.6      08 Dec 2020 09:35  Phos  2.5     12-08  Mg     2.0     12-08    TPro  6.8  /  Alb  3.2<L>  /  TBili  0.3  /  DBili  x   /  AST  22  /  ALT  29  /  AlkPhos  48  12-07    PT/INR - ( 07 Dec 2020 21:30 )   PT: 14.5 sec;   INR: 1.26 ratio         PTT - ( 07 Dec 2020 21:30 )  PTT:27.2 sec  Urinalysis Basic - ( 07 Dec 2020 23:53 )    Color: Yellow / Appearance: Clear / S.010 / pH: x  Gluc: x / Ketone: Negative  / Bili: Negative / Urobili: Negative mg/dL   Blood: x / Protein: Negative mg/dL / Nitrite: Negative   Leuk Esterase: Negative / RBC: 0-2 /HPF / WBC 0-2   Sq Epi: x / Non Sq Epi: Few / Bacteria: Occasional      CAPILLARY BLOOD GLUCOSE              RADIOLOGY & ADDITIONAL TESTS:          Imaging Personally Reviewed:  [ ] YES  [ ] NO    Consultant(s) Notes Reviewed:  [ ] YES  [ ] NO    Care Discussed with Consultants/Other Providers [x ] YES  [ ] NO Patient is a 65y old  Female who presents with a chief complaint of Passed out post-op (08 Dec 2020 10:02)      INTERVAL HPI/ OVERNIGHT EVENTS: Pt was seen and examined at bedside today, s/p Abdominoplasty, Arm and butt lift on 20. Pt admits that after her procedure she felling drowsy, fatigued and lightheaded, she does not recall any other symptoms prior to passing out. Syncope was witness, and it lasted for 1 second, no shaking, loss of urine or tongue bitting. she currently denies any CP, SOB, palpitations and lightheadedness, post surgical wound pain is minimal     MEDICATIONS  (STANDING):  amoxicillin  875 milliGRAM(s)/clavulanate 1 Tablet(s) Oral two times a day  heparin   Injectable 5000 Unit(s) SubCutaneous every 12 hours  lactated ringers. 1000 milliLiter(s) (125 mL/Hr) IV Continuous <Continuous>    MEDICATIONS  (PRN):  acetaminophen   Tablet .. 650 milliGRAM(s) Oral every 6 hours PRN Temp greater or equal to 38C (100.4F), Mild Pain (1 - 3)  ondansetron Injectable 4 milliGRAM(s) IV Push every 6 hours PRN Nausea  oxycodone    5 mG/acetaminophen 325 mG 1 Tablet(s) Oral every 6 hours PRN Severe Pain (7 - 10)      Allergies    No Known Allergies    Intolerances        REVIEW OF SYSTEMS:    Unable to examine due to [ ] Encephalopathy [ ] Advanced Dementia [ ] Expressive Aphasia [ ] Non-verbal patient    CONSTITUTIONAL: No fever, NO generalized weakness/Fatigue, No weight loss  EYES: No eye pain, visual disturbances, or discharge  ENMT:  No difficulty hearing, tinnitus, vertigo; No sinus or throat pain  NECK: No pain or stiffness  RESPIRATORY: No shortness of breath,  cough, wheezing, sputum or hemoptysis   CARDIOVASCULAR: No chest pain, palpitations, or leg swelling  GASTROINTESTINAL: No abdominal pain. No nausea, vomiting, diarrhea or constipation. No melena or hematochezia.  GENITOURINARY: No dysuria, frequency, hematuria, or incontinence  NEUROLOGICAL: No headaches, Dizziness, memory loss, loss of strength, numbness, or tremors  SKIN: Surgical wounds, w/ tenderness    MUSCULOSKELETAL: No joint pain or swelling; No muscle, back, or extremity pain  PSYCHIATRIC: No depression, anxiety, mood swings, or difficulty sleeping  HEME/LYMPH: No easy bruising, or bleeding gums      Vital Signs Last 24 Hrs  T(C): 37.4 (08 Dec 2020 07:37), Max: 37.4 (07 Dec 2020 20:43)  T(F): 99.3 (08 Dec 2020 07:37), Max: 99.3 (07 Dec 2020 20:43)  HR: 112 (08 Dec 2020 07:37) (91 - 123)  BP: 160/82 (08 Dec 2020 07:37) (114/89 - 160/82)  BP(mean): 97 (08 Dec 2020 05:30) (97 - 97)  RR: 18 (08 Dec 2020 07:37) (18 - 25)  SpO2: 96% (08 Dec 2020 07:37) (96% - 98%)    PHYSICAL EXAM:  GENERAL: NAD, well-developed, well-groomed  HEAD:  Atraumatic, Normocephalic  EYES: conjunctiva and sclera clear  ENMT: Moist mucous membranes  NECK: Supple, No JVD, Normal thyroid  CHEST/LUNG: Clear to Auscultation bilaterally; No rales, rhonchi, wheezing, or rubs  HEART: Regular rate and rhythm; No murmurs, rubs, or gallops  ABDOMEN: Soft, Nontender, Nondistended; Bowel sounds present  EXTREMITIES:  2+ Peripheral Pulses, No clubbing, cyanosis, or edema  SKIN: post surgical wounds w/ JESSICA drain in place (appears hemorrhagic)   NERVOUS SYSTEM:  Alert & Oriented X3, Good concentration; Motor Strength 5/5 B/L upper and lower extremities    LABS:                        8.8    12.56 )-----------( 206      ( 08 Dec 2020 09:35 )             26.0     12-08    145  |  111<H>  |  10  ----------------------------<  133<H>  4.0   |  28  |  0.87    Ca    8.6      08 Dec 2020 09:35  Phos  2.5     12-08  Mg     2.0     12-08    TPro  6.8  /  Alb  3.2<L>  /  TBili  0.3  /  DBili  x   /  AST  22  /  ALT  29  /  AlkPhos  48  12-07    PT/INR - ( 07 Dec 2020 21:30 )   PT: 14.5 sec;   INR: 1.26 ratio         PTT - ( 07 Dec 2020 21:30 )  PTT:27.2 sec  Urinalysis Basic - ( 07 Dec 2020 23:53 )    Color: Yellow / Appearance: Clear / S.010 / pH: x  Gluc: x / Ketone: Negative  / Bili: Negative / Urobili: Negative mg/dL   Blood: x / Protein: Negative mg/dL / Nitrite: Negative   Leuk Esterase: Negative / RBC: 0-2 /HPF / WBC 0-2   Sq Epi: x / Non Sq Epi: Few / Bacteria: Occasional      CAPILLARY BLOOD GLUCOSE              RADIOLOGY & ADDITIONAL TESTS:          Imaging Personally Reviewed:  [ ] YES  [ ] NO    Consultant(s) Notes Reviewed:  [ ] YES  [ ] NO    Care Discussed with Consultants/Other Providers [x ] YES  [ ] NO

## 2020-12-08 NOTE — CONSULT NOTE ADULT - PROBLEM SELECTOR RECOMMENDATION 3
Presumed secondary to dehydration; resolved   s/p 4L IVF   Pt was encouraged to drink adequate water daily  no further intervention required. Anemia and acute blood loss anemia   Pt denies any hx of anemia or any bleeding prior to this procedure  As per Surgery pt had minimal blood loss w/ her procedure.   JESSICA drain still hemorrhagic   CT abd/pelvis consistent w/ post surgical changes.   Drop in Hgb overnight most likely due to IV hydration,   -Anemia workup and H/H monitoring recommended; can be done as outpatient w/ PCP.

## 2020-12-08 NOTE — DISCHARGE NOTE PROVIDER - CARE PROVIDER_API CALL
Marcos,   [facility]   [address]   8th [place], [address]  Phone: [phone]  Fax: (   )    -  Follow Up Time: 1-3 days

## 2020-12-08 NOTE — CONSULT NOTE ADULT - PROBLEM SELECTOR RECOMMENDATION 4
Presumed secondary to dehydration; resolved   s/p 4L IVF   Pt was encouraged to drink adequate water daily  no further intervention required.

## 2020-12-08 NOTE — DISCHARGE NOTE PROVIDER - NSDCFUADDAPPT_GEN_ALL_CORE_FT
Please follow up with your primary care physician within the week regarding your recent procedure and hospitalization.   Call your Plastic Surgeon to schedule an appointment for follow up in 2-3 days.

## 2020-12-08 NOTE — DISCHARGE NOTE PROVIDER - PROVIDER TOKENS
FREE:[LAST:[Marcos],PHONE:[phone]],FAX:[(   [phone]    -],ADDRESS:[[facility]   [address] [place] [address], [id number]],FOLLOWUP:[1-3 days]]

## 2020-12-08 NOTE — DISCHARGE NOTE PROVIDER - NSDCFUADDINST_GEN_ALL_CORE_FT
Please continue postop care/wound care instructions as per your plastic surgeon. Follow up with Dr. Rodríguez in the office in a few days for follow up. Keep JESSICA drains in place, empty and record drain output daily and bring journal to follow up appointment.     Do not drive if taking prescribed narcotic pain medications.     Please notify MD sooner or return to the ER with any new or worsening symptoms, uncontrollable pain, foul smelling discharge or drainage from wound, excessive bleeding or swelling, fever >101, chest pain, shortness of breath, abdominal pain, nausea/vomiting, syncope, or other concerns/problems.

## 2020-12-08 NOTE — CONSULT NOTE ADULT - PROBLEM SELECTOR RECOMMENDATION 2
Anemia and acute blood loss anemia   As per Surgery pt had minimal blood loss w/ her procedure.   JESSICA drain still hemorrhagic   CT abd/pelvis consistent w/ post surgical changes.   Follow up w/ PCP for Anemia workup Presumed secondary to dehydration   denies CP/SOB or dizziness   improving w/ IVF   check orthostatics Presumed secondary to dehydration   EKG: ST @112bmpm, Q wave and TWI in lead III  denies CP/SOB or dizziness   improving w/ IVF   check orthostatics

## 2020-12-08 NOTE — H&P ADULT - ASSESSMENT
66 y/o female PMHx pre-diabetes and recent abdominoplasty, Brazilian butt lift, and arm surgery yesterday 12/7 with Dr. Marvin Rodríguez c/o "passing out" post-operatively.     Plan:  -Admit for observation. ER physician Dr. Quinn discussed patient, imaging, and labs with Dr. Rodríguez. He reports a normal operation and post-op care in the PACU. Dr. Rodríguez wants patient to be admitted to the hospital overnight for observation, IV hydration, and to trend the lactate. I attempted to call Dr. Rodríguez as well but he did not answer the phone.   -IV hydration, regular diet  -Continue Augmentin 875/125mg BID as prescribed by surgeon  -Local wound care. JESSICA drain care, monitor output. Abdominal binder.  -DVT ppx, IS, OOB, ambulating, pain control with percocet  -Follow up AM labs. Trend lactate.  -D/w attending  64 y/o female PMHx pre-diabetes, open appendectomy, and recent abdominoplasty, Brazilian butt lift, and arm surgery yesterday 12/7 with Dr. Marvin Rodríguez c/o "passing out" post-operatively.     Plan:  -Admit for observation. ER physician Dr. Quinn discussed patient, imaging, and labs with Dr. Rodríguez. He reports a normal operation and post-op care in the PACU. Dr. Rodríguez wants patient to be admitted to the hospital overnight for observation, IV hydration, and to trend the lactate. I attempted to call Dr. Rordíguez as well but he did not answer the phone.   -IV hydration, regular diet  -Continue Augmentin 875/125mg BID as prescribed by surgeon  -Local wound care. JESSICA drain care, monitor output. Abdominal binder.  -DVT ppx, IS, OOB, ambulating, pain control with percocet  -Follow up AM labs. Trend lactate.  -D/w attending

## 2020-12-08 NOTE — DISCHARGE NOTE PROVIDER - NSDCCPCAREPLAN_GEN_ALL_CORE_FT
PRINCIPAL DISCHARGE DIAGNOSIS  Diagnosis: Near syncope  Assessment and Plan of Treatment:       SECONDARY DISCHARGE DIAGNOSES  Diagnosis: Dehydration  Assessment and Plan of Treatment:     Diagnosis: Lactate blood increase  Assessment and Plan of Treatment:

## 2020-12-08 NOTE — CONSULT NOTE ADULT - PROBLEM SELECTOR RECOMMENDATION 5
-unlikely infectious etiology   -Trending down w/ IV hydration Most likely reactive reaction from surgery; improving.   Pt denies any acute infectious symptoms   CXR: no acute changes, UA: benign. Most likely reactive reaction from surgery; improving.   Pt denies any acute infectious symptoms   CXR: no acute changes, UA: benign.  Blood culture: pending.

## 2020-12-08 NOTE — PROGRESS NOTE ADULT - SUBJECTIVE AND OBJECTIVE BOX
INTERVAL HPI/OVERNIGHT EVENTS:  Pt. seen and examined at bedside, resting comfortably. Patient states she is feeling "better." Denies abdominal pain, nausea/vomiting, chest pain, sob, dizziness, palpitations. Tolerating regular diet.   Denies cough, or urinary symptoms.     Vital Signs Last 24 Hrs  T(C): 37.4 (08 Dec 2020 11:15), Max: 37.4 (07 Dec 2020 20:43)  T(F): 99.3 (08 Dec 2020 11:15), Max: 99.3 (07 Dec 2020 20:43)  HR: 108 (08 Dec 2020 11:15) (91 - 123)  BP: 132/83 (08 Dec 2020 11:15) (114/89 - 160/82)  BP(mean): 97 (08 Dec 2020 05:30) (97 - 97)  RR: 19 (08 Dec 2020 11:15) (18 - 25)  SpO2: 98% (08 Dec 2020 11:15) (96% - 98%)    PHYSICAL EXAM:    GENERAL: NAD, well-developed  HEAD:  Atraumatic, Normocephalic  EYES: Conjunctiva and sclera clear  NERVOUS SYSTEM:  Alert & Oriented X3  CHEST/LUNG: Clear to auscultation bilaterally; No rales, rhonchi, wheezing  HEART: +S1S2, mildly tachycardic, regular   ABDOMEN: Abdominal binder in place. Dressings dry/intact with post-op blood stain, no active bleeding. Wounds clean. JESSICA x 2 with s/s output. Nondistended, +BS, soft, mild appropriate incisional tenderness, no guarding, no rigidity.   Dressings all changed (New Gauze and ABD pads placed). JESSICA drains stripped.   EXTREMITIES: UE dressings clean/dry/intact.  2+ Peripheral Pulses, No clubbing, cyanosis, or edema      I&O's Detail    08 Dec 2020 07:01  -  08 Dec 2020 13:29  --------------------------------------------------------  IN:  Total IN: 0 mL    OUT:    Bulb (mL): 25 mL    Bulb (mL): 100 mL  Total OUT: 125 mL    Total NET: -125 mL      LABS:                        8.8    12.56 )-----------( 206      ( 08 Dec 2020 09:35 )             26.0     12-08    145  |  111<H>  |  10  ----------------------------<  133<H>  4.0   |  28  |  0.87    Ca    8.6      08 Dec 2020 09:35  Phos  2.5     12-08  Mg     2.0     12-08    TPro  6.8  /  Alb  3.2<L>  /  TBili  0.3  /  DBili  x   /  AST  22  /  ALT  29  /  AlkPhos  48  12-07    PT/INR - ( 07 Dec 2020 21:30 )   PT: 14.5 sec;   INR: 1.26 ratio         PTT - ( 07 Dec 2020 21:30 )  PTT:27.2 sec  Urinalysis Basic - ( 07 Dec 2020 23:53 )    Color: Yellow / Appearance: Clear / S.010 / pH: x  Gluc: x / Ketone: Negative  / Bili: Negative / Urobili: Negative mg/dL   Blood: x / Protein: Negative mg/dL / Nitrite: Negative   Leuk Esterase: Negative / RBC: 0-2 /HPF / WBC 0-2   Sq Epi: x / Non Sq Epi: Few / Bacteria: Occasional    Assessment: 66 y/o female PMHx pre-diabetes, open appendectomy, and recent abdominoplasty, Brazilian butt lift, and arm surgery yesterday  with Dr. Marvin Rodríguez c/o "passing out" post-operatively, admitted with Near Syncopal Episode postop. Leukocytosis and lactate trending down s/p IVF resuscitation. Patient with no complaints, stable H/H.   Orthostatic vitals NML.      Plan:  As per discussion with Dr. Rodríguez (her plastic surgeon at [facility]), surgery yesterday was uneventful with minimal blood loss. After discussing patient with Dr. Rodríguez, he recommends discharging patient home to follow up with him in the office, patient likely dehydrated postop.   Hospitalist consulted (Dr. Gutierrez) for syncopal workup and medical comanagement - as per discussion with Dr. Gutierrez, patient medically stable for D/C home if orthostatic vitals NML.   Per discussion with Dr. Edge, patient surgically stable for d/c to follow up with her primary care physician and plastic surgeon later this week.

## 2020-12-08 NOTE — H&P ADULT - NSHPLABSRESULTS_GEN_ALL_CORE
9.8    18.25 )-----------( 217      ( 07 Dec 2020 21:30 )             29.3   12-07    137  |  105  |  13  ----------------------------<  282<H>  4.2   |  19<L>  |  1.21    Ca    8.2<L>      07 Dec 2020 21:30    TPro  6.8  /  Alb  3.2<L>  /  TBili  0.3  /  DBili  x   /  AST  22  /  ALT  29  /  AlkPhos  48  12-07      < from: CT Abdomen and Pelvis w/ IV Cont (12.07.20 @ 22:45) >    FINDINGS:    LOWER CHEST: Subsegmental atelectasis.    LIVER: Unremarkable.  GALLBLADDER/BILE DUCTS: No intrahepatic or extrahepatic biliary dilatation. No significant gallbladder edema.  PANCREAS: Unremarkable.  SPLEEN: Unremarkable.    ADRENALS: Unremarkable.  KIDNEYS/URETERS: No hydronephrosis, hydroureter or significant perinephric stranding.  BLADDER: Partially distended.  REPRODUCTIVE ORGANS: Calcified/noncalcified uterine fibroids. No adnexal mass.    BOWEL: No bowel obstruction. The appendix not visualized, but no secondary signs of appendicitis. Under distended colon. Ascending colon diverticulosis. Prominent wall of the distal stomach. Fluid-filled visualized distal esophagus.  PERITONEUM: No drainable fluid collection or free air.  VESSELS: Atherosclerosis. Normal caliber of the abdominal aorta.  RETROPERITONEUM: No lymphadenopathy.  ABDOMINAL WALL/SOFT TISSUES: Nonspecific stranding and air along the abdominal, flank and pelvic wall soft tissue, reflecting recent postsurgical changes. JESSICA drain entering the left lower abdominal wall and terminating in the right lower abdominal wall soft tissue, with trace fluid/stranding along the midline anterior pelvic wall soft tissue. Areas of intermediate fluid/stranding in bilateral lower posterolateral chest/upper flank soft tissue, for example, 11.4 x 1.4 x 17.0 cm on the left and 1.5 x 5.6 x 7.4 cm on the right, which may contain blood products/debris. Small fat-containing umbilical hernia. High attenuation material at theumbilicus, which may represent packing material.  BONES: Transitional lumbosacral anatomy. Degenerative changes of the spine. Grade 1 anterolisthesis of L4 on L5. Chronic mild anterior wedging of midthoracic spine.    IMPRESSION:    Postsurgical changes. Trace fluid/stranding along the JESSICA drain in the midline anterior pelvic wall soft tissue. Additional areas of intermediate fluid/stranding in bilateral lower posterolateral chest/upper flank soft tissue, which may contain blood products/debris. Recommend clinical correlation to assess underlying infection.    Prominent wall of the distal stomach, which may be due to partial distention. Recommend clinical correlation to assess gastritis. Follow-up endoscopy may be obtained to exclude within the underlying lesion/neoplasm.    Additional findings as described.    < end of copied text >

## 2020-12-08 NOTE — H&P ADULT - NSHPPHYSICALEXAM_GEN_ALL_CORE
PHYSICAL EXAM:  GENERAL: NAD, well-developed  HEAD:  Atraumatic, Normocephalic  EYES: Conjunctiva and sclera clear  ENMT: No tonsillar erythema, exudates, or enlargement; Moist mucous membranes, No lesions  NECK: Supple, No JVD, Normal thyroid  NERVOUS SYSTEM:  Alert & Oriented X3  CHEST/LUNG: Clear to auscultation bilaterally; No rales, rhonchi, wheezing  HEART: Regular rate and rhythm. S1S2  ABDOMEN: Abdominal binder in place. Dressings dry/intact with mild serosanguinous stain. Abdominal pads and steri strips intact. JPs x 2 with serosangunious output. Nondistended, +BS, soft, mild appropriate incisional tenderness, no guarding, no rigidity   EXTREMITIES: UE dressings clean/dry/intact.  2+ Peripheral Pulses, No clubbing, cyanosis, or edema

## 2020-12-09 LAB
CULTURE RESULTS: NO GROWTH — SIGNIFICANT CHANGE UP
SPECIMEN SOURCE: SIGNIFICANT CHANGE UP

## 2020-12-10 DIAGNOSIS — R55 SYNCOPE AND COLLAPSE: ICD-10-CM

## 2020-12-10 DIAGNOSIS — R00.0 TACHYCARDIA, UNSPECIFIED: ICD-10-CM

## 2020-12-10 DIAGNOSIS — E87.2 ACIDOSIS: ICD-10-CM

## 2020-12-10 DIAGNOSIS — Z86.19 PERSONAL HISTORY OF OTHER INFECTIOUS AND PARASITIC DISEASES: ICD-10-CM

## 2020-12-10 DIAGNOSIS — R73.03 PREDIABETES: ICD-10-CM

## 2020-12-10 DIAGNOSIS — E86.0 DEHYDRATION: ICD-10-CM

## 2020-12-10 DIAGNOSIS — D62 ACUTE POSTHEMORRHAGIC ANEMIA: ICD-10-CM

## 2020-12-10 DIAGNOSIS — N17.9 ACUTE KIDNEY FAILURE, UNSPECIFIED: ICD-10-CM

## 2020-12-13 LAB
CULTURE RESULTS: SIGNIFICANT CHANGE UP
CULTURE RESULTS: SIGNIFICANT CHANGE UP
SPECIMEN SOURCE: SIGNIFICANT CHANGE UP
SPECIMEN SOURCE: SIGNIFICANT CHANGE UP

## 2021-02-07 NOTE — PHYSICAL THERAPY INITIAL EVALUATION ADULT - BALANCE DISTURBANCE, SYSTEM IMPAIRMENT CONTRIBUTE, REHAB EVAL
musculoskeletal
COVID-19 (Coronavirus Disease 2019)    WHAT YOU NEED TO KNOW:    COVID-19 is the disease caused by a coronavirus first discovered in 2019. Coronaviruses generally cause upper respiratory (nose, throat, and lung) infections, such as a cold. The new virus can also cause serious lower respiratory conditions, such as pneumonia or acute respiratory distress syndrome (ARDS). The new virus can also affect many other organs, including the brain and heart. Blood vessels can also be affected, leading to blood clots. Anyone can develop serious problems from the new virus, but your risk is higher if you are 65 or older. A weak immune system, diabetes, or a heart or lung condition can also increase your risk. Your risk is also higher if you are a current or former cigarette smoker.    DISCHARGE INSTRUCTIONS:    If you think you or someone you know may be infected: Do the following to protect others:   •If emergency care is needed, tell the  about the possible infection, or call ahead and tell the emergency department.      •Call a healthcare provider for instructions if symptoms are mild. Anyone who may be infected should not arrive without calling first. The provider will need to protect staff members and other patients.      •The person who may be infected needs to wear a face covering while getting medical care. This will help lower the risk of infecting others. Coverings are not used for anyone who is younger than 2 years, has breathing problems, or cannot remove it. The provider can give you instructions for anyone who cannot wear a covering.      Call your local emergency number (911 in the [place]) or an emergency department if:   •You have trouble breathing or shortness of breath at rest.      •You have chest pain or pressure that lasts longer than 5 minutes.      •You become confused or hard to wake.      •Your lips or face are blue.      •You have a fever of 104°F (40°C) or higher.      Call your doctor if:   •You do not have symptoms of COVID-19 but had close physical contact within 14 days with someone who tested positive.      •You have questions or concerns about your condition or care.      Medicines: You may need any of the following for mild symptoms:   •Decongestants help reduce nasal congestion and help you breathe more easily. If you take decongestant pills, they may make you feel restless or cause problems with your sleep. Do not use decongestant sprays for more than a few days.      •Cough suppressants help reduce coughing. Ask your healthcare provider which type of cough medicine is best for you.      •Acetaminophen decreases pain and fever. It is available without a doctor's order. Ask how much to take and how often to take it. Follow directions. Read the labels of all other medicines you are using to see if they also contain acetaminophen, or ask your doctor or pharmacist. Acetaminophen can cause liver damage if not taken correctly. Do not use more than 4 grams (4,000 milligrams) total of acetaminophen in one day.       •NSAIDs, such as ibuprofen, help decrease swelling, pain, and fever. NSAIDs can cause stomach bleeding or kidney problems in certain people. If you take blood thinner medicine, always ask your healthcare provider if NSAIDs are safe for you. Always read the medicine label and follow directions.      •Take your medicine as directed. Contact your healthcare provider if you think your medicine is not helping or if you have side effects. Tell him or her if you are allergic to any medicine. Keep a list of the medicines, vitamins, and herbs you take. Include the amounts, and when and why you take them. Bring the list or the pill bottles to follow-up visits. Carry your medicine list with you in case of an emergency.      How the 2019 coronavirus spreads: The virus spreads quickly and easily. The virus can be passed starting 2 days before symptoms begin or before a positive test if symptoms never begin. The following are ways the virus is thought to spread, but more information may be coming:   •Droplets are the main way all coronaviruses spread. The virus travels in droplets that form when a person talks, coughs, or sneezes. The droplets can also float in the air for minutes or hours. Infection happens when you breathe in the droplets or get them in your eyes or nose. Close personal contact with an infected person increases your risk for infection. This means being within 6 feet (2 meters) of the person for at least 15 minutes over 24 hours.      •Person-to-person contact can spread the virus. For example, a person with the virus on his or her hands can spread it by shaking hands with someone.      •The virus can stay on objects and surfaces for a short time. You may become infected by touching the object or surface and then touching your eyes or mouth.      •An infected animal may be able to infect a person who touches it. This may happen at live markets or on a farm.      Help lower the risk for COVID-19: The best way to prevent infection is to avoid anyone who is infected, but this can be hard to do. An infected person can spread the virus before signs or symptoms begin, or even if signs or symptoms never develop. The following can help lower the risk for infection:     Prevent COVID-19 Infection     •Wash your hands often throughout the day. Use soap and water. Rub your soapy hands together, lacing your fingers, for at least 20 seconds. Rinse with warm, running water. Dry your hands with a clean towel or paper towel. Use hand  that contains alcohol if soap and water are not available. Teach children how to wash their hands and use hand .  Handwashing           •Cover sneezes and coughs. Turn your face away and cover your mouth and nose with a tissue. Throw the tissue away. Use the bend of your arm if a tissue is not available. Then wash your hands well with soap and water or use hand . Teach children how to cover a cough or sneeze.      •Wear a face covering (mask) around anyone who does not live in your home. Use a disposable non-medical mask, or make a cloth covering with at least 2 layers. Cover your mouth and your nose. Securely fasten it under your chin and on the sides of your face. Do not wear a plastic face shield instead of a covering. Continue social distancing and washing your hands often. A face covering is not a substitute for social distancing safety measures.  How to Wear a Face Covering (Mask)           •Follow worldwide, national, and local social distancing guidelines. Keep at least 6 feet (2 meters) between you and others. Also keep this distance from anyone who comes to your home, such as someone making a delivery.      •Make a habit of not touching your face. If you get the virus on your hands, you can transfer it to your eyes, nose, or mouth and become infected. You can also transfer it to objects, surfaces, or people. Do not touch your eyes, nose, or mouth without washing your hands first.      •Clean and disinfect high-touch surfaces and objects often. Use disinfecting wipes, or make a solution of 4 teaspoons of bleach in 1 quart (4 cups) of water. Clean and disinfect even if you think no one living in or coming to your home is infected with the virus.      •Ask about vaccines you may need. The COVID-19 vaccine is a shot given to help prevent infection caused by the novel coronavirus. It is given to adults and children 16 years of age or older. Your healthcare provider can give you more information about when the vaccine may be available to you. Get the influenza (flu) vaccine as soon as recommended each year, usually starting in September or October. Get the pneumonia vaccine if recommended.      Follow social distancing guidelines: National and local social distancing rules vary. Rules may change over time as restrictions are lifted. Restrictions may return if an outbreak happens where you live. It is important to know and follow all current social distancing rules in your area. The following are general guidelines:  •Stay home if you are sick or think you may have COVID-19. It is important to stay home if you are waiting for a testing appointment or for test results. Even if you do not have symptoms, you can pass the virus to others.      •Limit trips out of your home. Have food, medicines, and other supplies delivered and left at your door or other area, if possible. Plan trips out of your home so you make the fewest stops possible to limit close personal contact. Keep track of places you go. This will help contact tracers notify others if you become infected.      •Avoid close physical contact with anyone who does not live in your home. Do not shake hands with, hug, or kiss a person as a greeting. If you must use public transportation (such as a bus or subway), try to sit or stand away from others. Only allow necessary people into your home. Wear your face covering, and remind others to wear a face covering. Remind them to wash their hands when they arrive and before they leave. Do not let someone into your home or go to someone's home just to visit. Even if you both do not feel sick, the virus can pass from one of you to the other.      •Avoid in-person gatherings and crowds. Gatherings or crowds of 10 or more individuals can cause the virus to spread. Avoid places such as [place], beaches, sporting events, and tourist attractions. For events such as parties, holiday meals, [employer] services, and conferences, attend virtually (on a computer), if possible.      •Ask your healthcare provider for other ways to have appointments. Some providers offer phone, video, or other types of appointments. You may also be able to get prescriptions for a few months of your medicines at a time.      •Stay safe if you must go out to work. Keep physical distance between you and other workers as much as possible. Follow your employer's rules so everyone stays safe.      If you have COVID-19 and are recovering at home, healthcare providers will give you specific instructions to follow. The following are general guidelines to remind you how to keep others safe until you are well:   •Wash your hands often. Use soap and water as much as possible. Use hand  that contains alcohol if soap and water are not available. Dry your hands with a clean towel or paper towel. Do not share towels with anyone. If you use paper towels, throw them away in a lined trash can kept in your room or area. Use a covered trash can, if possible.      •Do not go out of your home unless it is necessary. Ask someone who is not infected to go out for groceries or supplies, or have them delivered. Do not go to your healthcare provider's office without an appointment.      •Only have close physical contact with a person giving direct care, or a baby or child you must care for. Family members and friends should not visit you. If possible, stay in a separate area or room of your home if you live with others. No one should go into the area or room except to give you care. You can visit with others by phone, video chat, e-mail, or similar systems.      •Wear a face covering while others are near you. This can help prevent droplets from spreading the virus when you talk, sneeze, or cough. Put the covering on before anyone comes into your room or area. Remind the person to cover his or her nose and mouth before coming in to provide care for you.      •Do not share items. Do not share dishes, towels, or other items with anyone. Items need to be washed after you use them.      •Protect your baby. Some newborns have tested positive for the virus. It is not known if they became infected before or after birth. The highest risk is when a  has close contact with an infected person. If you are pregnant or breastfeeding, talk to your healthcare provider or obstetrician about any concerns you have. He or she will tell you when to bring your baby in for check-ups and vaccines. He or she will also tell you what to do if you think your baby was infected with the coronavirus. Wash your hands and put on a clean face covering before you breastfeed or care for your baby.      •Do not handle live animals unless it is necessary. Some animals, including pets, have been infected with the new coronavirus. Ask someone who is not infected to take care of your pet until you are well. If you must care for a pet, wear a face covering. Wash your hands before and after you give care. Talk to your healthcare provider about how to keep a service animal safe, if needed.      •Follow directions from your healthcare provider for being around others after you recover. It is not known if or for how long a recovered person can pass the virus to others. Your provider may give you instructions, such as continuing social distancing and wearing a face covering. He or she will tell you when it is okay to be around others again. This may be 10 to 20 days after symptoms started or you had a positive test. Most symptoms will also need to be gone. Your provider will give you more information.      Follow up with your doctor as directed: Write down your questions so you remember to ask them during your visits.    For more information:   •[facility] [employer]  [address] [place] [address]  Phone: 1-350.780.8238  Web Address: http://www.[employer].gov           © Copyright [employer]            back to top                          © Copyright [employer]

## 2021-04-26 PROBLEM — Z78.9 OTHER SPECIFIED HEALTH STATUS: Chronic | Status: ACTIVE | Noted: 2019-07-19

## 2021-05-25 PROBLEM — Z00.00 ENCOUNTER FOR PREVENTIVE HEALTH EXAMINATION: Status: ACTIVE | Noted: 2021-05-25

## 2021-06-04 ENCOUNTER — APPOINTMENT (OUTPATIENT)
Dept: VASCULAR SURGERY | Facility: CLINIC | Age: 66
End: 2021-06-04
Payer: MEDICARE

## 2021-06-04 ENCOUNTER — NON-APPOINTMENT (OUTPATIENT)
Age: 66
End: 2021-06-04

## 2021-06-04 VITALS — HEART RATE: 70 BPM | DIASTOLIC BLOOD PRESSURE: 92 MMHG | SYSTOLIC BLOOD PRESSURE: 139 MMHG | TEMPERATURE: 96.4 F

## 2021-06-04 VITALS
HEIGHT: 64 IN | BODY MASS INDEX: 29.02 KG/M2 | WEIGHT: 170 LBS | DIASTOLIC BLOOD PRESSURE: 90 MMHG | SYSTOLIC BLOOD PRESSURE: 138 MMHG | HEART RATE: 73 BPM

## 2021-06-04 DIAGNOSIS — Z71.9 COUNSELING, UNSPECIFIED: ICD-10-CM

## 2021-06-04 DIAGNOSIS — Z82.49 FAMILY HISTORY OF ISCHEMIC HEART DISEASE AND OTHER DISEASES OF THE CIRCULATORY SYSTEM: ICD-10-CM

## 2021-06-04 PROCEDURE — 99203 OFFICE O/P NEW LOW 30 MIN: CPT

## 2021-06-04 PROCEDURE — 99072 ADDL SUPL MATRL&STAF TM PHE: CPT

## 2021-06-04 PROCEDURE — 93970 EXTREMITY STUDY: CPT

## 2021-12-10 ENCOUNTER — APPOINTMENT (OUTPATIENT)
Dept: VASCULAR SURGERY | Facility: CLINIC | Age: 66
End: 2021-12-10

## 2024-10-06 NOTE — PATIENT PROFILE ADULT - NSASFALLNEEDSASSISTWITH_GEN_A_NUR
[Capable of only limited self care, confined to bed or chair more than 50% of waking hours] : Status 3- Capable of only limited self care, confined to bed or chair more than 50% of waking hours [Obese] : obese [Normal] : normal appearance, no rash, nodules, vesicles, ulcers, erythema [de-identified] : In wheelchair standing